# Patient Record
Sex: MALE | Race: WHITE | Employment: OTHER | URBAN - METROPOLITAN AREA
[De-identification: names, ages, dates, MRNs, and addresses within clinical notes are randomized per-mention and may not be internally consistent; named-entity substitution may affect disease eponyms.]

---

## 2017-01-11 ENCOUNTER — NURSING HOME VISIT (OUTPATIENT)
Dept: GERIATRICS | Facility: CLINIC | Age: 82
End: 2017-01-11
Payer: MEDICARE

## 2017-01-11 VITALS
BODY MASS INDEX: 25.83 KG/M2 | SYSTOLIC BLOOD PRESSURE: 111 MMHG | RESPIRATION RATE: 18 BRPM | WEIGHT: 180 LBS | OXYGEN SATURATION: 94 % | HEART RATE: 82 BPM | DIASTOLIC BLOOD PRESSURE: 62 MMHG | TEMPERATURE: 97.8 F

## 2017-01-11 DIAGNOSIS — E11.649 TYPE 2 DIABETES MELLITUS WITH HYPOGLYCEMIA WITHOUT COMA, WITH LONG-TERM CURRENT USE OF INSULIN (H): Primary | ICD-10-CM

## 2017-01-11 DIAGNOSIS — Z79.4 TYPE 2 DIABETES MELLITUS WITH HYPOGLYCEMIA WITHOUT COMA, WITH LONG-TERM CURRENT USE OF INSULIN (H): Primary | ICD-10-CM

## 2017-01-11 DIAGNOSIS — F03.90 DEMENTIA WITHOUT BEHAVIORAL DISTURBANCE, UNSPECIFIED DEMENTIA TYPE: ICD-10-CM

## 2017-01-11 DIAGNOSIS — I10 BENIGN ESSENTIAL HYPERTENSION: ICD-10-CM

## 2017-01-11 DIAGNOSIS — R13.10 DYSPHAGIA, UNSPECIFIED TYPE: ICD-10-CM

## 2017-01-11 PROCEDURE — 99207 ZZC CDG-CORRECTLY CODED, REVIEWED AND AGREE: CPT | Performed by: NURSE PRACTITIONER

## 2017-01-11 PROCEDURE — 99309 SBSQ NF CARE MODERATE MDM 30: CPT | Performed by: NURSE PRACTITIONER

## 2017-01-11 NOTE — PROGRESS NOTES
[place] [facility]    Chief Complaint   Patient presents with     skilled nursing Regulatory       HPI:    Titus Seay is a 89 year old  (10/27/1927), who is being seen today for a federally mandated E/M visit at [facility].       Today's concerns are:  Type 2 diabetes mellitus with hypoglycemia, with current long term insulin use (H)  Lantus was increased recently due to elevated BGs (300s) and A1c. Although he was also ill with pneumonia.  Last BG Levels:    AM: 97, 121, 123, 80, 99, 87    Noon: 185, 257, 245, 156, 132, 468    Dinner: 176, 276, 204, 118, 75, 256    A1C      8.7   12/14/2016  A1C      8.6   9/21/2016      Dementia without behavioral disturbance, unspecified dementia type  No behavioral concerns per nursing. Poor short-term memory. Needs assist with ADLs, 24 hour supervision. No recent falls, but does sustain frequent skin tears.    Benign essential hypertension  No antihypertensive medication other than lasix.  BP readings range:  111/62  150/91  109/69  120/73  112/75  121/72  124/94  116/61  118/68      Dysphagia, unspecified type  Currently being followed by SLP due to recurrent aspiration pneumonia. He is on nectar thick liquids and still shows some signs of aspiration. She had wanted to conduct a video swallow study, but his daughter declined, saying that they wouldn't want his diet further restricted anyway for quality of life.      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 He also has no past medical history of Thyroid disease.  PAST SURGICAL HISTORY:  has past surgical history that includes drain skin abscess simple (7/2007); REMOVE EYE W IMPLANT (1995); colonoscopy; Excise mass head (11/15/2012); TOTAL KNEE ARTHROPLASTY (8/2010); and Fusion cervical posterior one level (10/14/2013).  FAMILY HISTORY: family history includes Alzheimer Disease in his father; Breast Cancer in his sister; C.A.D. in his brother; Cancer - colorectal in his mother; Circulatory in his brother; DIABETES in his brother, mother, and sister. There is no history of Anesthesia Reaction, OSTEOPOROSIS, or Thyroid Disease.  SOCIAL HISTORY:  reports that he quit smoking about 33 years ago. His smoking use included Pipe. He has never used smokeless tobacco. He reports that he does not drink alcohol or use illicit drugs.    MEDICATIONS:  Current Outpatient Prescriptions   Medication Sig Dispense Refill     insulin glargine (LANTUS SOLOSTAR) 100 UNIT/ML injection Inject 20 Units Subcutaneous every morning       ipratropium - albuterol 0.5 mg/2.5 mg/3 mL (DUONEB) 0.5-2.5 (3) MG/3ML nebulization Take 1 vial by nebulization 4 times daily as needed for shortness of breath / dyspnea or wheezing        insulin lispro (HUMALOG KWIKPEN) 100 UNIT/ML soln Inject 3 Units Subcutaneous daily Give after lunch       Skin Protectants, Misc. (HYDROCERIN) LOTN Apply to right lower leg topically as needed for dry skin apply to right leg once a day       sodium chloride (OCEAN) 0.65 % nasal spray Spray 2 sprays into both nostrils 2 times daily       Furosemide (LASIX PO) Take 20 mg by mouth 2 times daily        POTASSIUM CHLORIDE PO Take 10 mEq by mouth 2 times daily        triamcinolone (KENALOG) 0.1 % cream Apply topically 2 times daily Apply to calves       Acetaminophen (TYLENOL PO) Take  1,000 mg by mouth 3 times daily       traMADol (ULTRAM) 25 MG TABS Take 25 mg by mouth daily Before BF scheduled.       loperamide (IMODIUM) 2 MG capsule Take 2 mg by mouth as needed for diarrhea 1 capsule after each loose stool as needed.Notify MD if diarrhea persists longer than 48 hours       METFORMIN HCL PO Take 1,000 mg by mouth 2 times daily (with meals)        PREDNISONE PO Take 10 mg by mouth daily        polyvinyl alcohol (LIQUIFILM TEARS) 1.4 % ophthalmic solution Place 1 drop into both eyes 2 times daily       Mirtazapine (REMERON PO) Take 15 mg by mouth At Bedtime          Case Management:  I have reviewed the care plan and MDS and do agree with the plan. Patient's desire to return to the community is not present.  Information reviewed:  Medications, vital signs, orders, and nursing notes.    ROS:  Reliability questionable secondary to cognitive impairment. Denies chest pain, shortness of breath, fevers, chills, headache, nausea, vomiting, dysuria or bowel abnormalities.      Exam:  /62 mmHg  Pulse 82  Temp(Src) 97.8  F (36.6  C)  Resp 18  Wt 180 lb (81.647 kg)  SpO2 94%   GENERAL APPEARANCE:  Alert, in no distress, cooperative  ENT:  Mouth and posterior oropharynx normal, moist mucous membranes, normal hearing acuity  EYES:  EOM, conjunctivae, lids, pupils and irises normal  NECK:  No adenopathy,masses or thyromegaly  RESP:  respiratory effort and palpation of chest normal, lungs with mild expiratory wheezing, no crackles or rhonchi , no respiratory distress  CV:  Palpation and auscultation of heart done , regular rate and rhythm, no murmur, rub, or gallop, peripheral edema 2+ in BLE  ABDOMEN:  normal bowel sounds, soft, nontender, no hepatosplenomegaly or other masses  SKIN:  skin thin, several bruises on arms, hands, legs. Several darker bruises, small skin tears on left shin  PSYCH:  oriented to self, insight and judgement impaired, memory impaired       Lab/Diagnostic data:      CBC  RESULTS:   Recent Labs   Lab Test 12/14/16 11/25/16   WBC  9.6  4.5   RBC  3.82*  4.06   HGB  11.7*  12.8*   HCT  36.7*  37.7   MCV  96  93   MCH  30.6  31.5   MCHC  31.9  34.0   RDW  14.0  12.9   PLT  397  246       Last Basic Metabolic Panel:  Recent Labs   Lab Test 12/14/16 11/25/16 02/14/14   0610   NA  139  139   < >  132*   POTASSIUM  4.3  4.0   < >  3.9   CHLORIDE  102  102   < >  98   ELEANOR  8.6  8.9   < >  7.7*   CO2  30   --    --   33*   BUN  16  22   < >  24   CR  0.89  0.95   < >  0.69   GLC  109*  192*   < >  232*    < > = values in this interval not displayed.       Liver Function Studies -   Recent Labs   Lab Test 07/13/16 02/09/14   1955   PROTTOTAL  6.4  4.9*   ALBUMIN  3.2  2.3*   BILITOTAL  0.4  0.4   ALKPHOS  106  100   AST  18  30   ALT  20  46       A1C      8.7   12/14/2016  A1C      8.6   9/21/2016        ASSESSMENT/PLAN  (E11.649,  Z79.4) Type 2 diabetes mellitus with hypoglycemia without coma, with long-term current use of insulin (H)  (primary encounter diagnosis)  Comment: Too tightly controlled now with some hypoglycemia. Will need to decrease basal insulin. He traditionally has higher BG at dinner.  Plan: Decrease lantus to 16 units daily. Increase lunch time Novolog to 5 units. BGM TID. Adjust medication as clinically indicated.    (F03.90) Dementia without behavioral disturbance, unspecified dementia type  Comment: Chronic, progressive. Needs 24 hour skilled nursing care. HPI/ROS unreliable with cognitive impairment. Expect further functional and cognitive decline. Expect weight loss.  Plan: Continue 24 hour care. Monitor weight. Monitor functional status. Monitor for behavioral disturbances.    (I10) Benign essential hypertension  Comment: Controlled. To avoid risk of hypotension, falls, dizziness and tissue hypoperfusion, recommend  BP goal is < 150/90mmHg.  Plan: Continue current POC with no changes at this time and adjustments as needed.    (R13.10) Dysphagia, unspecified  type  Comment: Due to dementia. Not expected to improve. He will be at ongoing risk for aspiration pneumonia.  Plan: Monitor for respiratory distress, hypoxia, fever.         Orders:  Decrease lantus to 16 units  Increase lunch novolog to 5 units      Electronically signed by:  DRE Nicholson [facility]  Pager: [phone]

## 2017-02-08 ENCOUNTER — NURSING HOME VISIT (OUTPATIENT)
Dept: GERIATRICS | Facility: CLINIC | Age: 82
End: 2017-02-08
Payer: MEDICARE

## 2017-02-08 VITALS
WEIGHT: 173 LBS | DIASTOLIC BLOOD PRESSURE: 67 MMHG | HEART RATE: 65 BPM | HEIGHT: 68 IN | SYSTOLIC BLOOD PRESSURE: 109 MMHG | OXYGEN SATURATION: 97 % | BODY MASS INDEX: 26.22 KG/M2 | RESPIRATION RATE: 18 BRPM | TEMPERATURE: 97.2 F

## 2017-02-08 DIAGNOSIS — G70.00 MYASTHENIA GRAVIS (H): ICD-10-CM

## 2017-02-08 DIAGNOSIS — E11.65 TYPE 2 DIABETES MELLITUS WITH HYPERGLYCEMIA, UNSPECIFIED LONG TERM INSULIN USE STATUS: Primary | ICD-10-CM

## 2017-02-08 DIAGNOSIS — R60.0 BILATERAL LOWER EXTREMITY EDEMA: ICD-10-CM

## 2017-02-08 DIAGNOSIS — I10 BENIGN ESSENTIAL HYPERTENSION: ICD-10-CM

## 2017-02-08 DIAGNOSIS — Z13.31 SCREENING FOR DEPRESSION: ICD-10-CM

## 2017-02-08 DIAGNOSIS — F03.90 DEMENTIA WITHOUT BEHAVIORAL DISTURBANCE, UNSPECIFIED DEMENTIA TYPE: ICD-10-CM

## 2017-02-08 PROCEDURE — 99207 ZZC CDG-CORRECTLY CODED, REVIEWED AND AGREE: CPT | Performed by: NURSE PRACTITIONER

## 2017-02-08 PROCEDURE — 99318 ZZC ANNUAL NURSING FAC ASSESSMNT, STABLE: CPT | Performed by: NURSE PRACTITIONER

## 2017-02-08 NOTE — PROGRESS NOTES
[place] [facility]  Chief Complaint   Patient presents with     Annual Comprehensive Nursing Home       HPI:    Titus Seay is a 89 year old  (10/27/1927), who is being seen today for an annual comprehensive visit at [facility].       Today's concerns are:  Type 2 diabetes mellitus with hyperglycemia, unspecified long term insulin use status (H)  On 1/11/17: Decreased lantus to 16 units, Increased lunch novolog to 5 units.    Last BG Levels starting with most recent:    AM:      134, 134, 136, 150, 109, 89,  127    Noon:   132, 218, 246, 469, 326, 145,230    Dinner:         230, 197, 144, 311, 62,  118    A1C      8.7   12/14/2016  A1C      8.6   9/21/2016      Dementia without behavioral disturbance, unspecified dementia type  Patient has had notable decline in the past few months. He is confused more often about where he is and what his day will be like. He often asks about going to work or thinks he has a dinner to get to with family. He has developed dysphagia and been treated for aspiration pneumonia twice. He has not had a cognitive eval since his admission 9/2015.    Benign essential hypertension  BP readings range:  109/67  133/78  142/83  118/71  126/72  124/75  146/85  132/75    Bilateral lower extremity edema  Chronic. Once daily lasix was not effective, edema improved with BID dosing. Elevation and ACE wraps are also very effective, but he puts his legs in a dependent position often and will remove the ACE wraps himself. He gets skin tears and open wounds very easily.    Myasthenia gravis (H)  Initial diagnosis was in 2014. He has been on maintenance prednisone for about 2 years. As mentioned, he has thin skin, gets frequent skin tears. 2 skin tears in the past developed into larger wounds with subsequent cellulitis.    Screening for depression  PHQ-9 done 9/1/16 ; score = 02/27.        ALLERGIES: Sulfa drugs and Bactrim  PROBLEM LIST:  Patient Active Problem List   Diagnosis      Nonproliferative diabetic retinopathy (H)     Diabetic macular edema (H)     Wenckebach's incomplete AV block     Hyperlipidemia LDL goal <100     Advanced directives, counseling/discussion     DJD (degenerative joint disease) of knee     Cervicalgia     Cervical spine tumor     Status post cervical spinal fusion     Compression of brainstem (H)     Phimosis     Adjustment disorder with depressed mood     Dysphagia     Syncope and collapse     Myasthenia gravis (H)     GI bleed     Mild cognitive impairment     OA (osteoarthritis) of knee     Generalized muscle weakness     Left knee pain     Lumbago     Skin tear of left lower leg without complication, sequela     Bilateral lower extremity edema     Benign essential hypertension     Dementia without behavioral disturbance, unspecified dementia type     Type 2 diabetes mellitus with hyperglycemia (H)     PAST MEDICAL HISTORY:  has a past medical history of Type II or unspecified type diabetes mellitus without mention of complication, not stated as uncontrolled; Nonproliferative diabetic retinopathy NOS(362.03) (H); Diabetic macular edema(362.07) (H); Wenckebach's incomplete AV block; Hypertension; URI (upper respiratory infection); Unspecified cerebral artery occlusion with cerebral infarction; Cataract, left; History of eye removal (1989); 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 He also has no past medical history of Thyroid disease.  PAST SURGICAL HISTORY:  has past surgical history that includes drain skin abscess simple (7/2007); REMOVE EYE W IMPLANT (1995); colonoscopy; Excise mass head (11/15/2012); TOTAL KNEE ARTHROPLASTY (8/2010); and Fusion cervical posterior one level (10/14/2013).  FAMILY HISTORY: family history includes Alzheimer Disease in his father; Breast Cancer in his sister;  C.A.D. in his brother; Cancer - colorectal in his mother; Circulatory in his brother; DIABETES in his brother, mother, and sister. There is no history of Anesthesia Reaction, OSTEOPOROSIS, or Thyroid Disease.  SOCIAL HISTORY:  reports that he quit smoking about 33 years ago. His smoking use included Pipe. He has never used smokeless tobacco. He reports that he does not drink alcohol or use illicit drugs.  IMMUNIZATIONS:  Most Recent Immunizations   Administered Date(s) Administered     Influenza (High Dose) 3 valent vaccine 10/17/2013     Influenza (IIV3) 10/19/2016     Pneumococcal (PCV 13) 09/25/2015     Pneumococcal 23 valent 12/31/2009     TD (ADULT, 7+) 01/05/2009     Above immunizations pulled from [facility]. [facility] and facility records also reconciled. Future immunizations are not needed at this point as all recommended immunizations are up to date.   MEDICATIONS:  Current Outpatient Prescriptions   Medication Sig Dispense Refill     insulin glargine (LANTUS SOLOSTAR) 100 UNIT/ML injection Inject 16 Units Subcutaneous every morning       insulin lispro (HUMALOG KWIKPEN) 100 UNIT/ML injection Inject 5 Units Subcutaneous daily Give after lunch       ipratropium - albuterol 0.5 mg/2.5 mg/3 mL (DUONEB) 0.5-2.5 (3) MG/3ML nebulization Take 1 vial by nebulization 4 times daily as needed for shortness of breath / dyspnea or wheezing        Skin Protectants, Misc. (HYDROCERIN) LOTN Apply to right lower leg topically as needed for dry skin apply to right leg once a day       sodium chloride (OCEAN) 0.65 % nasal spray Spray 2 sprays into both nostrils 2 times daily       Furosemide (LASIX PO) Take 20 mg by mouth 2 times daily        POTASSIUM CHLORIDE PO Take 10 mEq by mouth 2 times daily        triamcinolone (KENALOG) 0.1 % cream Apply topically 2 times daily Apply to calves       Acetaminophen (TYLENOL PO) Take 1,000 mg by mouth 3 times daily       traMADol (ULTRAM) 25 MG TABS Take 25 mg by mouth daily Before BF  "scheduled.       loperamide (IMODIUM) 2 MG capsule Take 2 mg by mouth as needed for diarrhea 1 capsule after each loose stool as needed.Notify MD if diarrhea persists longer than 48 hours       METFORMIN HCL PO Take 1,000 mg by mouth 2 times daily (with meals)        PREDNISONE PO Take 10 mg by mouth daily        polyvinyl alcohol (LIQUIFILM TEARS) 1.4 % ophthalmic solution Place 1 drop into both eyes 2 times daily       Mirtazapine (REMERON PO) Take 15 mg by mouth At Bedtime          Case Management:  I have reviewed the facility/SNF care plan/MDS which was done 12/1/16, including the falls risk, nutrition and pain screening. I also reviewed the current immunizations, and preventive care.. Future cancer screening is not clinically indicated secondary to age/goals of care.   Patient's desire to return to the community is not present.    Advance Directive Discussion:    I reviewed the current advanced directives as reflected in EPIC and the facility chart. I contacted the first party and left a message regarding the plan of Care. I reviewed the POLST, resigned, dated and sent to [employer].  I did not due to cognitive impairment review the advance directives with the resident.     Team Discussion:  I communicated with the appropriate disciplines involved with the Plan of Care:   Nursing, PT,  and Dietician    Patient Goal:  Patient's goal is pain control and comfort.    Information reviewed:  Medications, vital signs, orders, and nursing notes.    ROS:  Reliability questionable secondary to cognitive impairment. Denies chest pain, shortness of breath, fevers, chills, headache, nausea, vomiting, dysuria or bowel abnormalities. He repeatedly asks about going to work today    Exam:  /67 mmHg  Pulse 65  Temp(Src) 97.2  F (36.2  C)  Resp 18  Ht 5' 8\" (1.727 m)  Wt 173 lb (78.472 kg)  BMI 26.31 kg/m2  SpO2 97%  GENERAL APPEARANCE:  Alert, in no distress  ENT:  Mouth and posterior oropharynx " normal, moist mucous membranes, [place]  EYES:  EOM, conjunctivae, lids, pupils and irises normal  NECK:  No adenopathy,masses or thyromegaly  RESP:  respiratory effort and palpation of chest normal, lungs clear to auscultation , no respiratory distress  CV:  Palpation and auscultation of heart done , regular rate and rhythm, no murmur, rub, or gallop, Location; Lower extremity, Skin:  multiple small stage 2 open areas, Temperature: Warm, Color:  Ruborous, Hair:  Absent and Edema:  +1  ABDOMEN:  normal bowel sounds, soft, nontender, no hepatosplenomegaly or other masses  SKIN:  see vascular  PSYCH:  oriented to self, insight and judgement impaired, memory impaired , affect abnormal flat         Lab/Diagnostic data:      CBC RESULTS:   Recent Labs   Lab Test 12/14/16 11/25/16   WBC  9.6  4.5   RBC  3.82*  4.06   HGB  11.7*  12.8*   HCT  36.7*  37.7   MCV  96  93   MCH  30.6  31.5   MCHC  31.9  34.0   RDW  14.0  12.9   PLT  397  246       Last Basic Metabolic Panel:  Recent Labs   Lab Test 12/14/16 11/25/16 02/14/14   0610   NA  139  139   < >  132*   POTASSIUM  4.3  4.0   < >  3.9   CHLORIDE  102  102   < >  98   ELEANOR  8.6  8.9   < >  7.7*   CO2  30   --    --   33*   BUN  16  22   < >  24   CR  0.89  0.95   < >  0.69   GLC  109*  192*   < >  232*    < > = values in this interval not displayed.       Liver Function Studies -   Recent Labs   Lab Test 07/13/16 02/09/14 1955   PROTTOTAL  6.4  4.9*   ALBUMIN  3.2  2.3*   BILITOTAL  0.4  0.4   ALKPHOS  106  100   AST  18  30   ALT  20  46       A1C      8.7   12/14/2016  A1C      8.6   9/21/2016            ASSESSMENT/PLAN  (E11.65) Type 2 diabetes mellitus with hyperglycemia, unspecified long term insulin use status (H)  (primary encounter diagnosis)  Comment: BG are so variable at this time, it is too difficult to adjust insulin. There was only 1 day where he had hypoglycemia, otherwise fasting BG are good. Would not reduce basal insulin. He may need prandial insulin  with breakfast as well, but too variable to start at this time  Plan: Continue current POC with no changes at this time. BGM TID. Adjust medication as clinically indicated.    (F03.90) Dementia without behavioral disturbance, unspecified dementia type  Comment: Chronic, progressive. Recent decline is consistent with the disease. Needs 24 hour skilled nursing care. HPI/ROS unreliable with cognitive impairment. Expect further functional and cognitive decline. Expect weight loss.  Plan: Therapy will conduct cognitive solutions evaluation to better determine his care needs. Continue 24 hour care. Monitor weight. Monitor functional status. Monitor for behavioral disturbances.    (I10) Benign essential hypertension  Comment: Based on JNC-8 goals,  patients age of 89 year old, presence of diabetes or CKD, and goals of care goal BP is <150/90 mm Hg. BP is controlled.   Plan: Continue current POC with no changes at this time with routine assessment.    (R60.0) Bilateral lower extremity edema  Comment: Chronic, reasonably controlled with current interventions.   Plan: Continue current POC with no changes at this time and adjustments as needed.    (G70.00) Myasthenia gravis (H)  Comment: His symptoms of dysphagia and progressive weakness are likely related to dementia. He may benefit from a tapering of the prednisone due to his diabetes and skin issues. Will need to investigate further, consult physician, and daughter.    (Z13.89) Screening for depression  Comment: Depression screen done: PHQ-9 Given screen score and clinical assessment patient is stable on current plan of care/interventions of Remeron and on going assessment will continue.          Electronically signed by:  DRE Nicholson [facility]  Pager: [phone]

## 2017-03-07 VITALS
HEART RATE: 63 BPM | OXYGEN SATURATION: 97 % | BODY MASS INDEX: 26.61 KG/M2 | WEIGHT: 175 LBS | SYSTOLIC BLOOD PRESSURE: 112 MMHG | RESPIRATION RATE: 18 BRPM | TEMPERATURE: 98.1 F | DIASTOLIC BLOOD PRESSURE: 71 MMHG

## 2017-03-08 ENCOUNTER — NURSING HOME VISIT (OUTPATIENT)
Dept: GERIATRICS | Facility: CLINIC | Age: 82
End: 2017-03-08
Payer: MEDICARE

## 2017-03-08 DIAGNOSIS — R60.0 BILATERAL LEG EDEMA: ICD-10-CM

## 2017-03-08 DIAGNOSIS — G70.00 MYASTHENIA GRAVIS (H): ICD-10-CM

## 2017-03-08 DIAGNOSIS — Z79.4 TYPE 2 DIABETES MELLITUS WITH RETINOPATHY, WITH LONG-TERM CURRENT USE OF INSULIN, MACULAR EDEMA PRESENCE UNSPECIFIED, UNSPECIFIED LATERALITY, UNSPECIFIED RETINOPATHY SEVERITY (H): Primary | ICD-10-CM

## 2017-03-08 DIAGNOSIS — E11.319 TYPE 2 DIABETES MELLITUS WITH RETINOPATHY, WITH LONG-TERM CURRENT USE OF INSULIN, MACULAR EDEMA PRESENCE UNSPECIFIED, UNSPECIFIED LATERALITY, UNSPECIFIED RETINOPATHY SEVERITY (H): Primary | ICD-10-CM

## 2017-03-08 PROCEDURE — 99207 ZZC CDG-CORRECTLY CODED, REVIEWED AND AGREE: CPT | Performed by: INTERNAL MEDICINE

## 2017-03-08 PROCEDURE — 99309 SBSQ NF CARE MODERATE MDM 30: CPT | Performed by: INTERNAL MEDICINE

## 2017-03-08 NOTE — PROGRESS NOTES
[place] [facility]  Nursing Home Regulatory Visit  March 8, 2017    Chief Complaint   Patient presents with     skilled nursing Regulatory       HPI:    Titus Seay is a 89 year old  (10/27/1927), who is being seen today for a federally mandated E/M visit at [facility]. Today's concerns are:    1) DM, Type II -- Hgb A1c 8.7 in December. Insulin subsequently adjusted, but sugars remain quite labile (dietary). Current regimen is metformin 1000 mg BID and glargine 16 units qam and lispro 5 units before lunch.   2) Myasthenia Gravis -- Previously followed by Dr. Peña with neurology. Currently maintained on prednisone 7.5 mg daily (decreased from 10 mg daily since I last saw him). No signs of exacerbation.   3) Chronic Bilateral LE Edema -- dependent edema with prednisone likely also contributing. Managed with furosemide 20 mg BID.     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     Coronary artery disease      systolic heart failure     Dementia      Diabetic macular edema(362.07) (H)      History of eye removal 1989     central retinal artery occlusion     Hypertension      Myasthenia gravis (H) 12/2013     ACh receptor AB postive, bulbar weakness     Nonproliferative diabetic retinopathy NOS(362.03) (H)      PE (pulmonary embolism) (H)      TIA (transient ischaemic attack) 2/2012     Type II or unspecified type diabetes mellitus without mention of complication, not stated as uncontrolled      Unspecified cerebral artery occlusion with cerebral infarction      TIA FEB. 2012      URI (upper respiratory infection)      sore throat, cough, hoarse - started last night - worse today      Wenckebach's incomplete AV block        PAST SURGICAL HISTORY:   Past Surgical History   Procedure Laterality Date     Drain  skin abscess simple  2007     right foot     C remove eye w implant       right eye     Colonoscopy       Excise mass head  11/15/2012     Procedure: EXCISE MASS HEAD;  EXCISION OF SCALP MASSES TIMES TWO;  Surgeon: Stevan Gonsales MD;  Location: [facility] OR     C total knee arthroplasty  2010     Fusion cervical posterior one level  10/14/2013     Procedure: FUSION CERVICAL POSTERIOR ONE LEVEL;  Cervical 1 - 2 Posterior Cervical Fusions with neuro monitoring, and Dorsal Slit of Foreskin;  Surgeon: Miah Hutson MD;  Location: [facility] OR       FAMILY HISTORY:   Family History   Problem Relation Age of Onset     Alzheimer Disease Father        at age 79 of Pneumonia     Cancer - colorectal Mother       at age 79     Breast Cancer Sister       at age 44      DIABETES Mother      DIABETES Brother       at age 70      Circulatory Brother       of an anurysm at age 70     DIABETES Sister      C.A.D. Brother      He  suddenly     Anesthesia Reaction No family hx of      OSTEOPOROSIS No family hx of      Thyroid Disease No family hx of        SOCIAL HISTORY:   Lives in a SNF    MEDICATIONS:  Current Outpatient Prescriptions   Medication Sig Dispense Refill     insulin glargine (LANTUS SOLOSTAR) 100 UNIT/ML injection Inject 16 Units Subcutaneous every morning       insulin lispro (HUMALOG KWIKPEN) 100 UNIT/ML injection Inject 5 Units Subcutaneous daily Give after lunch       ipratropium - albuterol 0.5 mg/2.5 mg/3 mL (DUONEB) 0.5-2.5 (3) MG/3ML nebulization Take 1 vial by nebulization 4 times daily as needed for shortness of breath / dyspnea or wheezing        Skin Protectants, Misc. (HYDROCERIN) LOTN Apply to right lower leg topically as needed for dry skin apply to right leg once a day       sodium chloride (OCEAN) 0.65 % nasal spray Spray 2 sprays into both nostrils 2 times daily       Furosemide (LASIX PO) Take 20 mg by mouth 2 times daily        POTASSIUM CHLORIDE PO Take 10 mEq by mouth  2 times daily        triamcinolone (KENALOG) 0.1 % cream Apply topically 2 times daily Apply to calves       Acetaminophen (TYLENOL PO) Take 1,000 mg by mouth 3 times daily       traMADol (ULTRAM) 25 MG TABS Take 25 mg by mouth daily Before BF scheduled.       loperamide (IMODIUM) 2 MG capsule Take 2 mg by mouth as needed for diarrhea 1 capsule after each loose stool as needed.Notify MD if diarrhea persists longer than 48 hours       METFORMIN HCL PO Take 1,000 mg by mouth 2 times daily (with meals)        PREDNISONE PO Take 7.5 mg by mouth daily        polyvinyl alcohol (LIQUIFILM TEARS) 1.4 % ophthalmic solution Place 1 drop into both eyes 2 times daily       Mirtazapine (REMERON PO) Take 15 mg by mouth At Bedtime          Medications reviewed:  Medications reconciled to facility chart and changes were made to reflect current medications as identified as above med list. Below are the changes that were made:   Medications stopped since last EPIC medication reconciliation:   There are no discontinued medications.  Medications started since last [facility] medication reconciliation:  No orders of the defined types were placed in this encounter.      Case Management:  I have reviewed the care plan and MDS and do agree with the plan.   Information reviewed:  Medications, vital signs, orders, and nursing notes.    ROS:  Unable to be obtained due to cognitive impairment or aphasia.     PHYSICAL EXAM:  /71  Pulse 63  Temp 98.1  F (36.7  C)  Resp 18  Wt 175 lb (79.4 kg)  SpO2 97%  BMI 26.61 kg/m2  Gen: sitting in wheelchair, alert, cooperative and in no acute distress  Card: RRR, S1, S2, no murmurs  Resp: lungs clear to auscultation bilaterally  GI: abdomen soft, not-tender  Ext: ACE wraps in place on bilateral LEs  Neuro: CX II-XII grossly in tact; ROM in all four extremities grossly in tact  Psych: memory, judgement and insight impaired    Lab/Diagnostic data:  Reviewed as per Epic    ASSESSMENT/PLAN    1) DM, Type II    Hgb A1c 8.7 in December. Insulin subsequently adjusted, but sugars remain quite labile (dietary).   -- continues on metformin 1000 mg BID and glargine 16 units qam and lispro 5 units before lunch  -- follow sugars and adjust insulin as able/needed    2) Myasthenia Gravis   Previously followed by Dr. Peña with neurology. Currently maintained on prednisone 7.5 mg daily (decreased from 10 mg daily since I last saw him). No signs of exacerbation.   -- continues on prednisone 7.5 mg daily -- agree w/ slow taper given DM, skin changes    3) Chronic Bilateral LE Edema   Dependent edema with prednisone likely also contributing.   -- continues on furosemide 20 mg BID    Titus Seay is stable. No concerns per nursing. No changes to plan of care today.    Electronically signed by:  Miya Crisostomo MD

## 2017-03-22 ENCOUNTER — NURSING HOME VISIT (OUTPATIENT)
Dept: GERIATRICS | Facility: CLINIC | Age: 82
End: 2017-03-22
Payer: MEDICARE

## 2017-03-22 VITALS
BODY MASS INDEX: 27.06 KG/M2 | WEIGHT: 178 LBS | TEMPERATURE: 97.3 F | RESPIRATION RATE: 14 BRPM | OXYGEN SATURATION: 96 % | HEART RATE: 68 BPM | SYSTOLIC BLOOD PRESSURE: 118 MMHG | DIASTOLIC BLOOD PRESSURE: 70 MMHG

## 2017-03-22 DIAGNOSIS — G47.00 INSOMNIA, UNSPECIFIED TYPE: Primary | ICD-10-CM

## 2017-03-22 DIAGNOSIS — G70.00 MYASTHENIA GRAVIS (H): ICD-10-CM

## 2017-03-22 PROCEDURE — 99308 SBSQ NF CARE LOW MDM 20: CPT | Mod: GW | Performed by: NURSE PRACTITIONER

## 2017-03-22 NOTE — PROGRESS NOTES
[place] [facility]    Chief Complaint   Patient presents with     Insomnia       HPI:    Titus Seay is a 89 year old  (10/27/1927), who is being seen today for an episodic care visit at [facility].       Today's concern is:  Insomnia, unspecified type  Nursing is reporting that patient does not sleep well at night. After discussion with daughter, will trial trazodone    Myasthenia gravis (H)  Due to frequent skin injuries that develop into cellulitis and worsening diabetes control, plan is to wean off prednisone if able. His symptoms leading to diagnosis of this were weakness and dysphagia. He has these ongoing, likely related to advancing dementia. Prednisone was decreased from 10mg to 7.5mg one month ago. No worsening of symptoms noted, daughter is in agreement to decrease further.      REVIEW OF SYSTEMS:  Reliability questionable secondary to cognitive impairment. Denies chest pain, shortness of breath, fevers, chills, headache, nausea, vomiting, dysuria or bowel abnormalities.    EXAM:  /70  Pulse 68  Temp 97.3  F (36.3  C)  Resp 14  Wt 178 lb (80.7 kg)  SpO2 96%  BMI 27.06 kg/m2  GENERAL APPEARANCE:  Alert, in no distress      ASSESSMENT / PLAN:  (G47.00) Insomnia, unspecified type  (primary encounter diagnosis)  Comment: Further increase in Remeron is unlikely to help with insomnia, would be best treated with trazodone  Plan: Trazodone 25mg qhs    (G70.00) Myasthenia gravis (H)  Comment: Difficult to assess whether symptoms would be from this vs dementia. Due to goals of care, limited life expectancy and side effects of prednisone, weaning off medication slowly is appropriate  Plan: Decrease prednisone to 5mg daily        Orders:  Trazodone 25mg po qhs  Decreased Prednisone 5mg po qday.         DRE Nicholson [facility]  Pager: [phone]

## 2017-03-24 RX ORDER — TRAZODONE HYDROCHLORIDE 50 MG/1
25 TABLET, FILM COATED ORAL AT BEDTIME
Qty: 30 TABLET | Refills: 1
Start: 2017-03-24 | End: 2017-05-10

## 2017-05-03 ENCOUNTER — NURSING HOME VISIT (OUTPATIENT)
Dept: GERIATRICS | Facility: CLINIC | Age: 82
End: 2017-05-03
Payer: MEDICARE

## 2017-05-03 VITALS
RESPIRATION RATE: 18 BRPM | TEMPERATURE: 97 F | BODY MASS INDEX: 26.46 KG/M2 | SYSTOLIC BLOOD PRESSURE: 129 MMHG | WEIGHT: 174 LBS | HEART RATE: 71 BPM | OXYGEN SATURATION: 95 % | DIASTOLIC BLOOD PRESSURE: 75 MMHG

## 2017-05-03 DIAGNOSIS — R60.0 BILATERAL LOWER EXTREMITY EDEMA: ICD-10-CM

## 2017-05-03 DIAGNOSIS — F03.90 DEMENTIA WITHOUT BEHAVIORAL DISTURBANCE, UNSPECIFIED DEMENTIA TYPE: ICD-10-CM

## 2017-05-03 DIAGNOSIS — I10 BENIGN ESSENTIAL HYPERTENSION: ICD-10-CM

## 2017-05-03 DIAGNOSIS — E11.649 TYPE 2 DIABETES MELLITUS WITH HYPOGLYCEMIA WITHOUT COMA, WITH LONG-TERM CURRENT USE OF INSULIN (H): Primary | ICD-10-CM

## 2017-05-03 DIAGNOSIS — G70.00 MYASTHENIA GRAVIS (H): ICD-10-CM

## 2017-05-03 DIAGNOSIS — Z79.4 TYPE 2 DIABETES MELLITUS WITH HYPOGLYCEMIA WITHOUT COMA, WITH LONG-TERM CURRENT USE OF INSULIN (H): Primary | ICD-10-CM

## 2017-05-03 PROCEDURE — 99207 ZZC CDG-CORRECTLY CODED, REVIEWED AND AGREE: CPT | Performed by: NURSE PRACTITIONER

## 2017-05-03 PROCEDURE — 99309 SBSQ NF CARE MODERATE MDM 30: CPT | Mod: GW | Performed by: NURSE PRACTITIONER

## 2017-05-03 NOTE — PROGRESS NOTES
[place] [facility]    Chief Complaint   Patient presents with     [facility] Regulatory       HPI:    Titus Seay is a 89 year old  (10/27/1927), who is being seen today for a federally mandated E/M visit at [facility].         Today's concerns are:  Type 2 diabetes mellitus with hypoglycemia, with long term insulin use (H)  On metformin, lantus, and humalog as below  Last BG Levels:    AM: 110, 128, 92, 126, 139, 117, 109    Noon: 209, 188, 353, 261, 177, 259    Dinner: 154, 160, 78, 158, 216, 109    Lab Results   Component Value Date    A1C 8.7 12/14/2016    A1C 8.6 09/21/2016         Dementia without behavioral disturbance, unspecified dementia type  Patient has had notable decline in the past few months. He is confused more often about where he is and what his day will be like. He often asks about going to work or thinks he has a dinner to get to with family. He has developed dysphagia and been treated for aspiration pneumonia twice.     Benign essential hypertension  BP readings range:  129/75  112/75  131/68  119/73  121/72  141/84  125/73  142/77  135/68    Bilateral lower extremity edema  Chronic, has had occasional skin tears that develop into larger wounds. Daily lasix did not control his edema, improved with BID.  Wt Readings from Last 2 Encounters:   05/03/17 174 lb (78.9 kg)   03/22/17 178 lb (80.7 kg)       Myasthenia gravis (H)  Due to frequent skin injuries that develop into cellulitis and worsening diabetes control, plan is to wean off prednisone if able. His symptoms leading to diagnosis of this were weakness and dysphagia. He has these ongoing, likely related to advancing dementia. Prednisone was decreased from 10mg to 7.5mg 2/2017, then decreased to 5mg 3/22/17. No worsening of symptoms noted, daughter is in agreement with wean plan.        ALLERGIES: Sulfa drugs and Bactrim [sulfamethoxazole w/trimethoprim]  PAST MEDICAL HISTORY:  has a past medical history of Arthritis;  BUNION- right (3/17/2008); Cataract, left; 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 He also has no past medical history of Thyroid disease.  PAST SURGICAL HISTORY:  has a past surgical history that includes drain skin abscess simple (7/2007); REMOVE EYE W IMPLANT (1995); colonoscopy; Excise mass head (11/15/2012); TOTAL KNEE ARTHROPLASTY (8/2010); and Fusion cervical posterior one level (10/14/2013).  FAMILY HISTORY: family history includes Alzheimer Disease in his father; Breast Cancer in his sister; C.A.D. in his brother; Cancer - colorectal in his mother; Circulatory in his brother; DIABETES in his brother, mother, and sister. There is no history of Anesthesia Reaction, OSTEOPOROSIS, or Thyroid Disease.  SOCIAL HISTORY:  reports that he quit smoking about 33 years ago. His smoking use included Pipe. He quit after 30.00 years of use. He has never used smokeless tobacco. He reports that he does not drink alcohol or use illicit drugs.    MEDICATIONS:  Current Outpatient Prescriptions   Medication Sig Dispense Refill     traZODone (DESYREL) 50 MG tablet Take 0.5 tablets (25 mg) by mouth At Bedtime 30 tablet 1     PREDNISONE PO Take 5 mg by mouth daily       insulin glargine (LANTUS SOLOSTAR) 100 UNIT/ML injection Inject 16 Units Subcutaneous every morning       insulin lispro (HUMALOG KWIKPEN) 100 UNIT/ML injection Inject 5 Units Subcutaneous daily Give after lunch       ipratropium - albuterol 0.5 mg/2.5 mg/3 mL  (DUONEB) 0.5-2.5 (3) MG/3ML nebulization Take 1 vial by nebulization 4 times daily as needed for shortness of breath / dyspnea or wheezing        Skin Protectants, Misc. (HYDROCERIN) LOTN Apply to right lower leg topically as needed for dry skin apply to right leg once a day       sodium chloride (OCEAN) 0.65 % nasal spray Spray 2 sprays into both nostrils 2 times daily       Furosemide (LASIX PO) Take 20 mg by mouth 2 times daily        POTASSIUM CHLORIDE PO Take 10 mEq by mouth 2 times daily        triamcinolone (KENALOG) 0.1 % cream Apply topically 2 times daily Apply to calves       Acetaminophen (TYLENOL PO) Take 1,000 mg by mouth 3 times daily       traMADol (ULTRAM) 25 MG TABS Take 25 mg by mouth daily Before BF scheduled.       METFORMIN HCL PO Take 1,000 mg by mouth 2 times daily (with meals)        polyvinyl alcohol (LIQUIFILM TEARS) 1.4 % ophthalmic solution Place 1 drop into both eyes 2 times daily       Mirtazapine (REMERON PO) Take 15 mg by mouth At Bedtime          Case Management:  I have reviewed the care plan and MDS and do agree with the plan. Patient's desire to return to the community is not present.  Information reviewed:  Medications, vital signs, orders, and nursing notes.    ROS:  Reliability questionable secondary to cognitive impairment. Denies chest pain, shortness of breath, fevers, chills, headache, nausea, vomiting, dysuria or bowel abnormalities.  Appetite is good.  No pain     Exam:  /75  Pulse 71  Temp 97  F (36.1  C)  Resp 18  Wt 174 lb (78.9 kg)  SpO2 95%  BMI 26.46 kg/m2   GENERAL APPEARANCE:  Alert, in no distress  ENT:  Mouth and posterior oropharynx normal, moist mucous membranes, [place]  EYES:  EOM, conjunctivae, lids, pupils and irises normal  NECK:  No adenopathy,masses or thyromegaly  RESP:  respiratory effort and palpation of chest normal, lungs clear to auscultation , no respiratory distress  CV:  Palpation and auscultation of heart done , regular rate and  rhythm, no murmur, rub, or gallop. ACE wraps on  ABDOMEN:  normal bowel sounds, soft, nontender, no hepatosplenomegaly or other masses  SKIN:  see vascular  PSYCH:  oriented to self, insight and judgement impaired, memory impaired , affect abnormal flat       Lab/Diagnostic data:    CBC RESULTS:   Recent Labs   Lab Test 12/14/16 11/25/16   WBC  9.6  4.5   RBC  3.82*  4.06   HGB  11.7*  12.8*   HCT  36.7*  37.7   MCV  96  93   MCH  30.6  31.5   MCHC  31.9  34.0   RDW  14.0  12.9   PLT  397  246       Last Basic Metabolic Panel:  Recent Labs   Lab Test 12/14/16 11/25/16 02/14/14   0610   NA  139  139   < >  132*   POTASSIUM  4.3  4.0   < >  3.9   CHLORIDE  102  102   < >  98   ELEANOR  8.6  8.9   < >  7.7*   CO2  30   --    --   33*   BUN  16  22   < >  24   CR  0.89  0.95   < >  0.69   GLC  109*  192*   < >  232*    < > = values in this interval not displayed.       Liver Function Studies -   Recent Labs   Lab Test 07/13/16 02/09/14   1955   PROTTOTAL  6.4  4.9*   ALBUMIN  3.2  2.3*   BILITOTAL  0.4  0.4   ALKPHOS  106  100   AST  18  30   ALT  20  46       TSH   Date Value Ref Range Status   12/09/2015 2.67 mcU/mL Final   12/26/2013 2.55 0.4 - 5.0 mU/L Final       Lab Results   Component Value Date    A1C 8.7 12/14/2016    A1C 8.6 09/21/2016           ASSESSMENT/PLAN  (E11.649,  Z79.4) Type 2 diabetes mellitus with hypoglycemia without coma, with long-term current use of insulin (H)  (primary encounter diagnosis)  Comment: Control is variable with intermittent hypoglycemia that should be avoided. Will need to decrease basal insulin due to this. Noon BG is consistently elevated, evening low at times. Will therefore change Humalog to every meal, but decrease dose to reduce evening hypoglycemia. It is likely that his BG will be changing due to decreased prednisone  Plan: Decrease lantus to 10 units, change humalog to 3 units TID. BGM TID. Adjust medication as clinically indicated.    (F03.90) Dementia without behavioral  disturbance, unspecified dementia type  Comment: Chronic, progressive. Needs 24 hour skilled nursing care. HPI/ROS unreliable due to  cognitive impairment. Expect further functional and cognitive decline. Expect weight loss.  Plan: Continue 24 hour care. Monitor weight. Monitor functional status. Monitor for behavioral disturbances.    (I10) Benign essential hypertension  Comment: Controlled. To avoid risk of hypotension, falls, dizziness and tissue hypoperfusion, recommend  BP goal is < 150/90mmHg.  Plan: Continue current POC with no changes at this time and adjustments as needed.    (R60.0) Bilateral lower extremity edema  Comment: Wraps not removed, but nursing feels edema is controlled. Weight is down.  Plan: Continue current POC with no changes at this time and adjustments as needed.    (G70.00) Myasthenia gravis (H)  Comment: May be difficult to differentiate symptoms of flare from progression of dementia. Due to his DM, frequent wounds with cellulitis, risk outweighs benefits of prednisone  Plan: Decrease prednisone 2.5mg daily        Orders:  Decrease lantus to 10 units qam  Change Humalog to 3 units TID with meals  Decrease prednisone to 2.5mg daily  CBC, BMP, A1c 6/28/17      Electronically signed by:  DRE Nicholson [facility]  Pager: [phone]

## 2017-05-10 ENCOUNTER — NURSING HOME VISIT (OUTPATIENT)
Dept: GERIATRICS | Facility: CLINIC | Age: 82
End: 2017-05-10
Payer: MEDICARE

## 2017-05-10 VITALS
DIASTOLIC BLOOD PRESSURE: 71 MMHG | OXYGEN SATURATION: 95 % | HEART RATE: 62 BPM | WEIGHT: 174 LBS | TEMPERATURE: 97.2 F | BODY MASS INDEX: 26.46 KG/M2 | RESPIRATION RATE: 18 BRPM | SYSTOLIC BLOOD PRESSURE: 109 MMHG

## 2017-05-10 DIAGNOSIS — E11.65 TYPE 2 DIABETES MELLITUS WITH HYPERGLYCEMIA, UNSPECIFIED LONG TERM INSULIN USE STATUS: Primary | ICD-10-CM

## 2017-05-10 DIAGNOSIS — R41.82 ALTERED MENTAL STATUS, UNSPECIFIED ALTERED MENTAL STATUS TYPE: ICD-10-CM

## 2017-05-10 DIAGNOSIS — F03.91 DEMENTIA WITH BEHAVIORAL DISTURBANCE, UNSPECIFIED DEMENTIA TYPE: ICD-10-CM

## 2017-05-10 PROCEDURE — 99207 ZZC CDG-CORRECTLY CODED, REVIEWED AND AGREE: CPT | Performed by: NURSE PRACTITIONER

## 2017-05-10 PROCEDURE — 99309 SBSQ NF CARE MODERATE MDM 30: CPT | Mod: GW | Performed by: NURSE PRACTITIONER

## 2017-05-10 NOTE — PROGRESS NOTES
[facility]    Chief Complaint   Patient presents with     RECHECK       HPI:    Titus Seay is a 89 year old  (10/27/1927), who is being seen today for an episodic care visit at [facility].       Today's concern is:  Type 2 diabetes mellitus with hyperglycemia, unspecified long term insulin use status (H)  Lantus insulin decreased 5/3/17 due to fasting hypoglycemia and humalog changed to TID (was at lunch only)  Last BG Levels:    AM: 108, 129, 132, 130, 91    Noon: 104, 217, 247, 211    Dinner: 132, 155, 123, 143, 94    Lab Results   Component Value Date    A1C 8.7 12/14/2016    A1C 8.6 09/21/2016       Altered mental status, unspecified altered mental status type  For about 3 days, patient has had agitation, verbal outbursts. He has been looking for his wife, has been distressed during the night at times. Staff have not noted any s/sx acute illness. Vitals are stable, no fever. No skin issues currently, he has had recurrent cellulitis in the past. His daughter agrees with stopping the trazodone and checking some labs    Dementia with behavioral disturbance, unspecified dementia type  Has had notable progression over the past year with worsening memory. If his current behavior is ongoing, daughter is in agreement with short-term use of antipsychotic medication       ALLERGIES: Sulfa drugs and Bactrim [sulfamethoxazole w/trimethoprim]  Past Medical, Surgical, Family and Social History reviewed and updated in [facility].    Current Outpatient Prescriptions   Medication Sig Dispense Refill     Insulin Lispro (HUMALOG SC) Inject 3 Units Subcutaneous 2 times daily With lunch and dinner. Give 5 units SC qday.       PREDNISONE PO Take 2.5 mg by mouth daily       ipratropium - albuterol 0.5 mg/2.5 mg/3 mL (DUONEB) 0.5-2.5 (3) MG/3ML nebulization Take 1 vial by nebulization 4 times daily as needed for shortness of breath / dyspnea or wheezing        Skin Protectants, Misc. (HYDROCERIN) LOTN Apply to  right lower leg topically as needed for dry skin apply to right leg once a day       sodium chloride (OCEAN) 0.65 % nasal spray Spray 2 sprays into both nostrils 2 times daily       Furosemide (LASIX PO) Take 20 mg by mouth 2 times daily        POTASSIUM CHLORIDE PO Take 10 mEq by mouth 2 times daily        triamcinolone (KENALOG) 0.1 % cream Apply topically 2 times daily Apply to calves       Acetaminophen (TYLENOL PO) Take 1,000 mg by mouth 3 times daily       traMADol (ULTRAM) 25 MG TABS Take 25 mg by mouth daily Before BF scheduled.       METFORMIN HCL PO Take 1,000 mg by mouth 2 times daily (with meals)        polyvinyl alcohol (LIQUIFILM TEARS) 1.4 % ophthalmic solution Place 1 drop into both eyes 2 times daily       Mirtazapine (REMERON PO) Take 15 mg by mouth At Bedtime            REVIEW OF SYSTEMS:  Reliability questionable secondary to cognitive impairment. Denies chest pain, shortness of breath, fevers, chills, headache, nausea, vomiting, dysuria or bowel abnormalities. No pain      Physical Exam:  /71  Pulse 62  Temp 97.2  F (36.2  C)  Resp 18  Wt 174 lb (78.9 kg)  SpO2 95%  BMI 26.46 kg/m2   GENERAL APPEARANCE:  Alert, in no distress  ENT:  Mouth and posterior oropharynx normal, moist mucous membranes, [place]  EYES:  EOM, conjunctivae, lids, pupils and irises normal  NECK:  No adenopathy,masses or thyromegaly  RESP:  respiratory effort and palpation of chest normal, lungs clear to auscultation , no respiratory distress  CV:  Palpation and auscultation of heart done , regular rate and rhythm, no murmur, rub, or gallop. ACE wraps on  ABDOMEN:  normal bowel sounds, soft, nontender, no hepatosplenomegaly or other masses  SKIN: L shin with superficial open area, not viewed, no concerns per staff  PSYCH:  oriented to self, insight and judgement impaired, memory impaired , affect abnormal flat       Recent Labs:      CBC RESULTS:   Recent Labs   Lab Test 12/14/16 11/25/16   WBC  9.6  4.5   RBC  3.82*   4.06   HGB  11.7*  12.8*   HCT  36.7*  37.7   MCV  96  93   MCH  30.6  31.5   MCHC  31.9  34.0   RDW  14.0  12.9   PLT  397  246       Last Basic Metabolic Panel:  Recent Labs   Lab Test 12/14/16 11/25/16 02/14/14   0610   NA  139  139   < >  132*   POTASSIUM  4.3  4.0   < >  3.9   CHLORIDE  102  102   < >  98   ELEANOR  8.6  8.9   < >  7.7*   CO2  30   --    --   33*   BUN  16  22   < >  24   CR  0.89  0.95   < >  0.69   GLC  109*  192*   < >  232*    < > = values in this interval not displayed.       Liver Function Studies -   Recent Labs   Lab Test 07/13/16 02/09/14   1955   PROTTOTAL  6.4  4.9*   ALBUMIN  3.2  2.3*   BILITOTAL  0.4  0.4   ALKPHOS  106  100   AST  18  30   ALT  20  46       Lab Results   Component Value Date    A1C 8.7 12/14/2016    A1C 8.6 09/21/2016           Assessment/Plan:  (E11.65) Type 2 diabetes mellitus with hyperglycemia, unspecified long term insulin use status (H)  (primary encounter diagnosis)  Comment: Ongoing intermittent hypoglycemia. HgA1c <8% not recommended in elderly due to risk of hypoglycemia. Risk outweighs benefit of tighter control.   Plan: D/C lantus. Increase breakfast humalog to 5 units, continue 3 units at lunch and dinner. BGM QID. Adjust medication as clinically indicated.    (R41.82) Altered mental status, unspecified altered mental status type  Comment: Difficult to assess whether this is acute due to illness vs progression of dementia. No evidence of PNA or cellulitis  Plan: D/C trazodone. CBC, BMP    (F03.91) Dementia with behavioral disturbance, unspecified dementia type  Comment: Chronic, progressive. Needs 24 hour skilled nursing care. HPI/ROS unreliable with cognitive impairment. New behavioral concerns are causing the patient emotional harm. If this continues, antipsychotic medication would be warrented, likely for a few months with hopes to taper off. Expect further functional and cognitive decline. Expect weight loss.  Plan: Continue 24 hour care. Monitor  weight. Monitor functional status. Monitor for behavioral disturbances, if ongoing for 2 more weeks, will start risperidone      Orders:  D/C trazodone  D/C elliot  BMP, CBC 5/11/17    Total time spent with patient visit was 45 min including patient visit and phone call to patient contact. Greater than 50% of total time spent with counseling and coordinating care due to altered mental status.       Electronically signed by  DRE Nicholson [facility]  Pager: [phone]

## 2017-05-11 ENCOUNTER — TRANSFERRED RECORDS (OUTPATIENT)
Dept: HEALTH INFORMATION MANAGEMENT | Facility: CLINIC | Age: 82
End: 2017-05-11

## 2017-05-11 LAB
ANION GAP SERPL CALCULATED.3IONS-SCNC: 8 MMOL/L (ref 3–14)
BUN SERPL-MCNC: 20 MG/DL (ref 7–30)
CALCIUM SERPL-MCNC: 9.1 MG/DL (ref 8.5–10.1)
CHLORIDE SERPLBLD-SCNC: 104 MMOL/L (ref 94–109)
CO2 SERPL-SCNC: 28 MMOL/L (ref 20–32)
CREAT SERPL-MCNC: 0.83 MG/DL (ref 0.66–1.25)
ERYTHROCYTE [DISTWIDTH] IN BLOOD BY AUTOMATED COUNT: 14.4 % (ref 10–15)
GFR SERPL CREATININE-BSD FRML MDRD: 87 ML/MIN/1.73M2
GLUCOSE SERPL-MCNC: 199 MG/DL (ref 70–99)
HCT VFR BLD AUTO: 37.1 % (ref 40–53)
HEMOGLOBIN: 12.2 G/DL (ref 13.3–17.7)
MCH RBC QN AUTO: 30.3 PG (ref 26.5–33)
MCHC RBC AUTO-ENTMCNC: 32.9 G/DL (ref 31.5–36.5)
MCV RBC AUTO: 92 FL (ref 78–100)
PLATELET # BLD AUTO: 266 10E9/L (ref 150–450)
POTASSIUM SERPL-SCNC: 4 MMOL/L (ref 3.4–5.3)
RBC # BLD AUTO: 4.03 10E12/L (ref 4.4–5.9)
SODIUM SERPL-SCNC: 140 MMOL/L (ref 133–144)
WBC # BLD AUTO: 7.6 10E9/L (ref 4–11)

## 2017-06-28 ENCOUNTER — TRANSFERRED RECORDS (OUTPATIENT)
Dept: HEALTH INFORMATION MANAGEMENT | Facility: CLINIC | Age: 82
End: 2017-06-28

## 2017-06-28 LAB
ANION GAP SERPL CALCULATED.3IONS-SCNC: 9 MMOL/L (ref 3–14)
BUN SERPL-MCNC: 17 MG/DL (ref 7–30)
CALCIUM SERPL-MCNC: 9.4 MG/DL (ref 8.5–10.1)
CHLORIDE SERPLBLD-SCNC: 105 MMOL/L (ref 94–109)
CO2 SERPL-SCNC: 28 MMOL/L (ref 20–32)
CREAT SERPL-MCNC: 0.8 MG/DL (ref 0.66–1.25)
ERYTHROCYTE [DISTWIDTH] IN BLOOD BY AUTOMATED COUNT: 15.1 % (ref 10–15)
GFR SERPL CREATININE-BSD FRML MDRD: >90 ML/MIN/1.73M2
GLUCOSE SERPL-MCNC: 80 MG/DL (ref 70–99)
HBA1C MFR BLD: 7.1 % (ref 4.3–6)
HCT VFR BLD AUTO: 38.3 % (ref 40–53)
HEMOGLOBIN: 12.3 G/DL (ref 13.3–17.7)
MCH RBC QN AUTO: 30.2 PG (ref 26.5–33)
MCHC RBC AUTO-ENTMCNC: 32.1 G/DL (ref 31.5–36.5)
MCV RBC AUTO: 94 FL (ref 78–100)
PLATELET # BLD AUTO: 302 10E9/L (ref 150–450)
POTASSIUM SERPL-SCNC: 3.5 MMOL/L (ref 3.4–5.3)
RBC # BLD AUTO: 4.07 10E12/L (ref 4.4–5.9)
SODIUM SERPL-SCNC: 142 MMOL/L (ref 133–144)
WBC # BLD AUTO: 8.5 10E9/L (ref 4–11)

## 2017-07-18 ENCOUNTER — NURSING HOME VISIT (OUTPATIENT)
Dept: GERIATRICS | Facility: CLINIC | Age: 82
End: 2017-07-18
Payer: MEDICARE

## 2017-07-18 VITALS
OXYGEN SATURATION: 94 % | BODY MASS INDEX: 26 KG/M2 | DIASTOLIC BLOOD PRESSURE: 66 MMHG | HEART RATE: 53 BPM | SYSTOLIC BLOOD PRESSURE: 112 MMHG | RESPIRATION RATE: 18 BRPM | WEIGHT: 171 LBS | TEMPERATURE: 96.7 F

## 2017-07-18 DIAGNOSIS — I10 BENIGN ESSENTIAL HYPERTENSION: ICD-10-CM

## 2017-07-18 DIAGNOSIS — F03.91 DEMENTIA WITH BEHAVIORAL DISTURBANCE, UNSPECIFIED DEMENTIA TYPE: ICD-10-CM

## 2017-07-18 DIAGNOSIS — R60.0 BILATERAL LOWER EXTREMITY EDEMA: Primary | ICD-10-CM

## 2017-07-18 PROCEDURE — 99207 ZZC CDG-CORRECTLY CODED, REVIEWED AND AGREE: CPT | Performed by: INTERNAL MEDICINE

## 2017-07-18 PROCEDURE — 99309 SBSQ NF CARE MODERATE MDM 30: CPT | Performed by: INTERNAL MEDICINE

## 2017-07-18 NOTE — PROGRESS NOTES
Bed 705-7 call @ [phone]   [facility]  Nursing Home Regulatory Visit  July 18, 2017    Chief Complaint   Patient presents with     FCI Regulatory       HPI:    Titus Seay is a 89 year old  (10/27/1927), who is being seen today for a federally mandated E/M visit at [facility]. Today's concerns are:    1) Chronic Bilateral LE Edema -- managed with furosemide 20 mg BID. Appears at baseline today.   2) Dementia With Behavioral Disturbance -- overall decline since I last saw him. Oriented to self. Managed with mirtazapine 15 mg qhs  3) HTN -- By history. SBPs 110s-140s. Not on any anti-HTN medications.     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 2012      URI (upper respiratory infection)     sore throat, cough, hoarse - started last night - worse today      Wenckebach's incomplete AV block        PAST SURGICAL HISTORY:   Past Surgical History:   Procedure Laterality Date     C REMOVE EYE W IMPLANT  1995    right eye     C TOTAL KNEE ARTHROPLASTY  8/2010     COLONOSCOPY       DRAIN SKIN ABSCESS SIMPLE  7/2007    right foot     EXCISE MASS HEAD  11/15/2012    Procedure: EXCISE MASS HEAD;  EXCISION OF SCALP MASSES TIMES TWO;   Surgeon: Stevan Gonsales MD;  Location: [facility] OR     FUSION CERVICAL POSTERIOR ONE LEVEL  10/14/2013    Procedure: FUSION CERVICAL POSTERIOR ONE LEVEL;  Cervical 1 - 2 Posterior Cervical Fusions with neuro monitoring, and Dorsal Slit of Foreskin;  Surgeon: Miah Hutson MD;  Location:  OR       FAMILY HISTORY:   Family History   Problem Relation Age of Onset     Alzheimer Disease Father        at age 79 of Pneumonia     Cancer - colorectal Mother       at age 79     Breast Cancer Sister       at age 44      DIABETES Mother      DIABETES Brother       at age 70      Circulatory Brother       of an anurysm at age 70     DIABETES Sister      C.A.D. Brother      He  suddenly     Anesthesia Reaction No family hx of      OSTEOPOROSIS No family hx of      Thyroid Disease No family hx of        SOCIAL HISTORY:   Lives in a SNF    MEDICATIONS:  Current Outpatient Prescriptions   Medication Sig Dispense Refill     Insulin Lispro (HUMALOG SC) Inject 3 Units Subcutaneous 2 times daily With lunch and dinner. Give 5 units SC qday.       PREDNISONE PO Take 2.5 mg by mouth daily       ipratropium - albuterol 0.5 mg/2.5 mg/3 mL (DUONEB) 0.5-2.5 (3) MG/3ML nebulization Take 1 vial by nebulization 4 times daily as needed for shortness of breath / dyspnea or wheezing        Skin Protectants, Misc. (HYDROCERIN) LOTN Apply to right lower leg topically as needed for dry skin apply to right leg once a day       sodium chloride (OCEAN) 0.65 % nasal spray Spray 2 sprays into both nostrils 2 times daily       Furosemide (LASIX PO) Take 20 mg by mouth 2 times daily        POTASSIUM CHLORIDE PO Take 10 mEq by mouth 2 times daily        triamcinolone (KENALOG) 0.1 % cream Apply topically 2 times daily Apply to calves       Acetaminophen (TYLENOL PO) Take 1,000 mg by mouth 3 times daily       traMADol (ULTRAM) 25 MG TABS Take 25 mg by mouth daily Before BF scheduled.       METFORMIN HCL PO Take 1,000 mg by mouth  2 times daily (with meals)        polyvinyl alcohol (LIQUIFILM TEARS) 1.4 % ophthalmic solution Place 1 drop into both eyes 2 times daily       Mirtazapine (REMERON PO) Take 15 mg by mouth At Bedtime          Medications reviewed:  Medications reconciled to facility chart and changes were made to reflect current medications as identified as above med list. Below are the changes that were made:   Medications stopped since last EPIC medication reconciliation:   There are no discontinued medications.  Medications started since last [facility] medication reconciliation:  No orders of the defined types were placed in this encounter.      Case Management:  I have reviewed the care plan and MDS and do agree with the plan.   Information reviewed:  Medications, vital signs, orders, and nursing notes.    ROS:  Unable to be obtained due to cognitive impairment or aphasia.     PHYSICAL EXAM:  /66  Pulse 53  Temp 96.7  F (35.9  C)  Resp 18  Wt 171 lb (77.6 kg)  SpO2 94%  BMI 26 kg/m2  Gen: sitting in recliner, alert and in no acute distress  Resp: breathing non-labored  GI: abdomen soft  Ext: baseline bilateral dependent LE edema  Neuro: CX II-XII grossly in tact; ROM in all four extremities grossly in tact  Psych: memory, judgement and insight impaired    Lab/Diagnostic data:  Reviewed as per Epic    ASSESSMENT/PLAN    1) Chronic Bilateral LE Edema   At baseline today  -- managed with furosemide 20 mg BID  -- elevate, follow clinically    2) Dementia With Behavioral Disturbance   Overall decline since I last saw him. Oriented to self.   -- continues on mirtazapine 15 mg qhs  -- ongoing 24/7 nursing and supportive cares    3) HTN   By history. SBPs 110s-140s. Not on any anti-HTN medications.   -- follow clinically     Titus Seay is stable. No concerns per nursing. No changes to plan of care today.    Electronically signed by:  Miya Crisostomo MD

## 2017-07-27 ENCOUNTER — DOCUMENTATION ONLY (OUTPATIENT)
Dept: OTHER | Facility: CLINIC | Age: 82
End: 2017-07-27

## 2017-07-27 DIAGNOSIS — Z71.89 ACP (ADVANCE CARE PLANNING): Chronic | ICD-10-CM

## 2017-09-13 ENCOUNTER — NURSING HOME VISIT (OUTPATIENT)
Dept: GERIATRICS | Facility: CLINIC | Age: 82
End: 2017-09-13
Payer: MEDICARE

## 2017-09-13 VITALS
RESPIRATION RATE: 18 BRPM | TEMPERATURE: 97.6 F | HEART RATE: 86 BPM | SYSTOLIC BLOOD PRESSURE: 116 MMHG | DIASTOLIC BLOOD PRESSURE: 67 MMHG | BODY MASS INDEX: 26.15 KG/M2 | OXYGEN SATURATION: 97 % | WEIGHT: 172 LBS

## 2017-09-13 DIAGNOSIS — E11.65 TYPE 2 DIABETES MELLITUS WITH HYPERGLYCEMIA, UNSPECIFIED LONG TERM INSULIN USE STATUS: Primary | ICD-10-CM

## 2017-09-13 DIAGNOSIS — G70.00 MYASTHENIA GRAVIS (H): ICD-10-CM

## 2017-09-13 DIAGNOSIS — R06.2 WHEEZING: ICD-10-CM

## 2017-09-13 DIAGNOSIS — F03.90 DEMENTIA WITHOUT BEHAVIORAL DISTURBANCE, UNSPECIFIED DEMENTIA TYPE: ICD-10-CM

## 2017-09-13 DIAGNOSIS — I10 BENIGN ESSENTIAL HYPERTENSION: ICD-10-CM

## 2017-09-13 DIAGNOSIS — R60.0 BILATERAL LOWER EXTREMITY EDEMA: ICD-10-CM

## 2017-09-13 PROCEDURE — 99309 SBSQ NF CARE MODERATE MDM 30: CPT | Mod: GW | Performed by: NURSE PRACTITIONER

## 2017-09-13 NOTE — PROGRESS NOTES
[facility]    Chief Complaint   Patient presents with     jail Regulatory       HPI:    Titus Seay is a 89 year old  (10/27/1927), who is being seen today for a federally mandated E/M visit at [facility].      HPI information obtained from: facility chart records, facility staff and patient report.      Today's concerns are:  Type 2 diabetes mellitus with hyperglycemia, unspecified long term insulin use status (H)  Last BG Levels:    AM: 199, 163, 167, 166, 160, 167, 170    Noon: 231, 240, 194, 167, 219, 172, 196    Dinner: 182, 269, 242, 118, 259, 125, 177    Lab Results   Component Value Date    A1C 7.1 06/28/2017    A1C 8.7 12/14/2016       Dementia without behavioral disturbance, unspecified dementia type  Some decline in the past year, with worsening memory, confusion, dysphagia.    Benign essential hypertension  On lasix only  BP readings range:  116/67  112/80  111/70  129/86  116/70  113/67  119/65  115/68  130/77  118/67  112/66    Bilateral lower extremity edema  Chronic  Wt Readings from Last 4 Encounters:   09/13/17 172 lb (78 kg)   07/18/17 171 lb (77.6 kg)   05/10/17 174 lb (78.9 kg)   05/03/17 174 lb (78.9 kg)       Wheezing  Currently has prn nebs, has not used one in some time. He says he does get SOB sometimes.    Myasthenia gravis (H)  Have been slowly weaning off prednisone due to frequent wounds, edema, overall goals of comfort care      ALLERGIES: Sulfa drugs and Bactrim [sulfamethoxazole w/trimethoprim]  PAST MEDICAL HISTORY:  has a past medical history of Arthritis; BUNION- right (3/17/2008); Cataract, left; Cervical spine tumor (10/7/2013); Coronary artery disease; Dementia; Diabetic macular edema(362.07) (H); History of eye removal (1989); Hypertension; Myasthenia gravis (H) (12/2013); Nonproliferative diabetic retinopathy NOS(362.03) (H); PE (pulmonary embolism) (H); TIA (transient ischaemic attack) (2/2012); Type II or unspecified type diabetes  mellitus without mention of complication, not stated as uncontrolled; Unspecified cerebral artery occlusion with cerebral infarction; URI (upper respiratory infection); and Wenckebach's incomplete AV block. He also has no past medical history of Thyroid disease.  PAST SURGICAL HISTORY:  has a past surgical history that includes drain skin abscess simple (7/2007); REMOVE EYE W IMPLANT (1995); colonoscopy; Excise mass head (11/15/2012); TOTAL KNEE ARTHROPLASTY (8/2010); and Fusion cervical posterior one level (10/14/2013).  FAMILY HISTORY: family history includes Alzheimer Disease in his father; Breast Cancer in his sister; C.A.D. in his brother; Cancer - colorectal in his mother; Circulatory in his brother; DIABETES in his brother, mother, and sister. There is no history of Anesthesia Reaction, OSTEOPOROSIS, or Thyroid Disease.  SOCIAL HISTORY:  reports that he quit smoking about 33 years ago. His smoking use included Pipe. He quit after 30.00 years of use. He has never used smokeless tobacco. He reports that he does not drink alcohol or use illicit drugs.    MEDICATIONS:  Current Outpatient Prescriptions   Medication Sig Dispense Refill     White Petrolatum ointment as needed for dry skin Apply to Lower extrmities topically as needed for Wound qd prn       Insulin Lispro (HUMALOG SC) Inject 3 Units Subcutaneous 2 times daily With lunch and dinner. Give 5 units SC qday.       PREDNISONE PO Take 2.5 mg by mouth daily       ipratropium - albuterol 0.5 mg/2.5 mg/3 mL (DUONEB) 0.5-2.5 (3) MG/3ML nebulization Take 1 vial by nebulization 4 times daily as needed for shortness of breath / dyspnea or wheezing        Skin Protectants, Misc. (HYDROCERIN) LOTN Apply to right lower leg topically as needed for dry skin apply to right leg once a day       sodium chloride (OCEAN) 0.65 % nasal spray Spray 2 sprays into both nostrils 2 times daily       Furosemide (LASIX PO) Take 20 mg by mouth 2 times daily        POTASSIUM CHLORIDE  PO Take 10 mEq by mouth 2 times daily        triamcinolone (KENALOG) 0.1 % cream Apply topically 2 times daily Apply to calves       Acetaminophen (TYLENOL PO) Take 1,000 mg by mouth 3 times daily       traMADol (ULTRAM) 25 MG TABS Take 25 mg by mouth daily Before BF scheduled.       METFORMIN HCL PO Take 1,000 mg by mouth 2 times daily (with meals)        polyvinyl alcohol (LIQUIFILM TEARS) 1.4 % ophthalmic solution Place 1 drop into both eyes 2 times daily       Mirtazapine (REMERON PO) Take 15 mg by mouth At Bedtime        Medications reviewed:  Medications reconciled to facility chart and changes were made to reflect current medications as identified as above med list.     Case Management:  I have reviewed the care plan and MDS and do agree with the plan. Patient's desire to return to the community is not present.  Information reviewed:  Medications, vital signs, orders, and nursing notes.    ROS:  Reliability questionable secondary to cognitive impairment. Denies chest pain, fevers, chills, headache, nausea, vomiting, dysuria or bowel abnormalities.     Exam:  Vitals: /67  Pulse 86  Temp 97.6  F (36.4  C)  Resp 18  Wt 172 lb (78 kg)  SpO2 97%  BMI 26.15 kg/m2  BMI= Body mass index is 26.15 kg/(m^2).  GENERAL APPEARANCE:  in no distress, sleeping in wheelchair, arousable  ENT:  Mouth and posterior oropharynx normal, moist mucous membranes  EYES:  EOM, conjunctivae, lids, pupils and irises normal  NECK:  No adenopathy,masses or thyromegaly  RESP:  no respiratory distress, rhonchi in bases, mostly clears with cough, wheezing throughout  CV:  Palpation and auscultation of heart done , regular rate and rhythm, no murmur, rub, or gallop, peripheral edema 2+ in BLE  ABDOMEN:  normal bowel sounds, soft, nontender, no hepatosplenomegaly or other masses  SKIN:  Inspection of skin and subcutaneous tissue baseline, Palpation of skin and subcutaneous tissue baseline  PSYCH:  oriented to self, insight and  judgement impaired, memory impaired     Lab/Diagnostic data:      CBC RESULTS:   Recent Labs   Lab Test 06/28/17 05/11/17   WBC  8.5  7.6   RBC  4.07*  4.03*   HGB  12.3*  12.2*   HCT  38.3*  37.1*   MCV  94  92   MCH  30.2  30.3   MCHC  32.1  32.9   RDW  15.1*  14.4   PLT  302  266       Last Basic Metabolic Panel:  Recent Labs   Lab Test 06/28/17 05/11/17   NA  142  140   POTASSIUM  3.5  4.0   CHLORIDE  105  104   ELEANOR  9.4  9.1   CO2  28  28   BUN  17  20   CR  0.80  0.83   GLC  80  199*       Liver Function Studies -   Recent Labs   Lab Test 07/13/16 02/09/14   1955   PROTTOTAL  6.4  4.9*   ALBUMIN  3.2  2.3*   BILITOTAL  0.4  0.4   ALKPHOS  106  100   AST  18  30   ALT  20  46       TSH   Date Value Ref Range Status   12/09/2015 2.67 mcU/mL Final   12/26/2013 2.55 0.4 - 5.0 mU/L Final         Lab Results   Component Value Date    A1C 7.1 06/28/2017    A1C 8.7 12/14/2016           ASSESSMENT/PLAN  (E11.65) Type 2 diabetes mellitus with hyperglycemia, unspecified long term insulin use status (H)  (primary encounter diagnosis)  Comment: Well controlled without hypoglycemia. Intermittent BG > 200 is not concerning. HgA1c <8% not recommended in elderly due to risk of hypoglycemia.  Risk outweighs benefit of tighter control.  Plan: Continue current POC with no changes at this time and adjustments as needed.    (F03.90) Dementia without behavioral disturbance, unspecified dementia type  Comment: Chronic, progressive. Needs 24 hour skilled nursing care. HPI/ROS difficult to obtain with cognitive impairment. Expect further functional and cognitive decline. Expect weight loss.  Plan: Continue 24 hour care. Monitor weight. Monitor functional status. Monitor for behavioral disturbances.    (I10) Benign essential hypertension  Comment: Chronic, controlled on lasix alone  Plan: Continue current POC with no changes at this time and adjustments as needed.    (R60.0) Bilateral lower extremity edema  Comment: Chronic condition  being managed with medications.  Plan: Continue current POC with no changes at this time and adjustments as needed.    (R06.2) Wheezing  Comment: Likely restrictive lung disease related to obesity, poor posture, shallow inspiration, dysphagia  Plan: Duoneb BID indefinitely    (G70.00) Myasthenia gravis (H)  Comment: Difficult to determine if decline, dysphagia is related to dementia vs myasthenia, but dementia is certainly progressing. Due to comfort care goals, it is in his best interest to d/c the prednisone due to side effects  Plan: D/C prednisone      Orders:  D/C prednisone  Duoneb BID         Electronically signed by:  DRE Nicholson [facility]  Pager: [phone]

## 2017-09-20 ENCOUNTER — TRANSFERRED RECORDS (OUTPATIENT)
Dept: HEALTH INFORMATION MANAGEMENT | Facility: CLINIC | Age: 82
End: 2017-09-20

## 2017-09-20 LAB
ANION GAP SERPL CALCULATED.3IONS-SCNC: 8 MMOL/L (ref 3–14)
BUN SERPL-MCNC: 16 MG/DL (ref 7–30)
CALCIUM SERPL-MCNC: 9.2 MG/DL (ref 8.5–10.1)
CHLORIDE SERPLBLD-SCNC: 103 MMOL/L (ref 94–109)
CO2 SERPL-SCNC: 30 MMOL/L (ref 20–32)
CREAT SERPL-MCNC: 0.94 MG/DL (ref 0.66–1.25)
ERYTHROCYTE [DISTWIDTH] IN BLOOD BY AUTOMATED COUNT: 13.5 % (ref 10–15)
GFR SERPL CREATININE-BSD FRML MDRD: 75 ML/MIN/1.73M2
GLUCOSE SERPL-MCNC: 129 MG/DL (ref 70–99)
HBA1C MFR BLD: 8 % (ref 4.3–6)
HCT VFR BLD AUTO: 35.4 % (ref 40–53)
HEMOGLOBIN: 11.4 G/DL (ref 13.3–17.7)
MCH RBC QN AUTO: 30.6 PG (ref 26.5–33)
MCHC RBC AUTO-ENTMCNC: 32.2 G/DL (ref 31.5–36.5)
MCV RBC AUTO: 95 FL (ref 78–100)
PLATELET # BLD AUTO: 268 10E9/L (ref 150–450)
POTASSIUM SERPL-SCNC: 3.7 MMOL/L (ref 3.4–5.3)
RBC # BLD AUTO: 3.72 10E12/L (ref 4.4–5.9)
SODIUM SERPL-SCNC: 141 MMOL/L (ref 133–144)
WBC # BLD AUTO: 7.4 10E9/L (ref 4–11)

## 2017-11-07 VITALS
WEIGHT: 168.5 LBS | OXYGEN SATURATION: 96 % | BODY MASS INDEX: 25.62 KG/M2 | RESPIRATION RATE: 20 BRPM | TEMPERATURE: 96.9 F | DIASTOLIC BLOOD PRESSURE: 69 MMHG | SYSTOLIC BLOOD PRESSURE: 108 MMHG | HEART RATE: 99 BPM

## 2017-11-07 RX ORDER — IPRATROPIUM BROMIDE AND ALBUTEROL SULFATE 2.5; .5 MG/3ML; MG/3ML
1 SOLUTION RESPIRATORY (INHALATION) 2 TIMES DAILY
COMMUNITY

## 2017-11-09 ENCOUNTER — NURSING HOME VISIT (OUTPATIENT)
Dept: GERIATRICS | Facility: CLINIC | Age: 82
End: 2017-11-09
Payer: MEDICARE

## 2017-11-09 DIAGNOSIS — Z79.4 TYPE 2 DIABETES MELLITUS WITH COMPLICATION, WITH LONG-TERM CURRENT USE OF INSULIN (H): ICD-10-CM

## 2017-11-09 DIAGNOSIS — E11.8 TYPE 2 DIABETES MELLITUS WITH COMPLICATION, WITH LONG-TERM CURRENT USE OF INSULIN (H): ICD-10-CM

## 2017-11-09 DIAGNOSIS — G70.00 MYASTHENIA GRAVIS WITHOUT EXACERBATION (H): ICD-10-CM

## 2017-11-09 DIAGNOSIS — I10 BENIGN ESSENTIAL HYPERTENSION: Primary | ICD-10-CM

## 2017-11-09 PROCEDURE — 99309 SBSQ NF CARE MODERATE MDM 30: CPT | Performed by: INTERNAL MEDICINE

## 2017-11-09 NOTE — PROGRESS NOTES
[facility]  Nursing Home Regulatory Visit  November 9, 2017    Chief Complaint   Patient presents with     correction Regulatory       HPI:    Titus Seay is a 89 year old  (10/27/1927), who is being seen today for a federally mandated E/M visit at [facility]. Today's concerns are:    1) HTN -- By history. SBPs 110s-120s, most 110s. Not on any anti-HTN medications.  2) Myasthenia Gravis -- Previously followed by Dr. Peña with neurology. Has been tapered off prednisone without any signs of exacerbation.  3) DM, Type II -- Hgb A1c 7.1 in June. Sugars 150s-260s, most <200 on lispro 5 units with breakfast and 2 units BID with lunch and dinner and metformin 1000 mg BID.     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 2012      URI (upper respiratory infection)     sore throat, cough, hoarse - started last night - worse today      Wenckebach's incomplete AV block        PAST SURGICAL HISTORY:   Past Surgical History:   Procedure Laterality Date     C REMOVE EYE W IMPLANT  1995    right eye     C TOTAL KNEE ARTHROPLASTY  8/2010     COLONOSCOPY       DRAIN SKIN ABSCESS SIMPLE  7/2007    right foot     EXCISE  MASS HEAD  11/15/2012    Procedure: EXCISE MASS HEAD;  EXCISION OF SCALP MASSES TIMES TWO;  Surgeon: Stevan Gonsales MD;  Location: [facility]     FUSION CERVICAL POSTERIOR ONE LEVEL  10/14/2013    Procedure: FUSION CERVICAL POSTERIOR ONE LEVEL;  Cervical 1 - 2 Posterior Cervical Fusions with neuro monitoring, and Dorsal Slit of Foreskin;  Surgeon: Miah Hutson MD;  Location: [facility] OR       FAMILY HISTORY:   Family History   Problem Relation Age of Onset     Alzheimer Disease Father        at age 79 of Pneumonia     Cancer - colorectal Mother       at age 79     Breast Cancer Sister       at age 44      DIABETES Mother      DIABETES Brother       at age 70      Circulatory Brother       of an anurysm at age 70     DIABETES Sister      C.A.D. Brother      He  suddenly     Anesthesia Reaction No family hx of      OSTEOPOROSIS No family hx of      Thyroid Disease No family hx of        SOCIAL HISTORY:   Lives in a SNF    MEDICATIONS:  Current Outpatient Prescriptions   Medication Sig Dispense Refill     ipratropium - albuterol 0.5 mg/2.5 mg/3 mL (DUONEB) 0.5-2.5 (3) MG/3ML neb solution Take 1 vial by nebulization 2 times daily       White Petrolatum ointment as needed for dry skin Apply to Lower extrmities topically as needed for Wound qd prn       Insulin Lispro (HUMALOG SC) Inject 3 Units Subcutaneous 2 times daily With lunch and dinner. Give 5 units SC qday.       ipratropium - albuterol 0.5 mg/2.5 mg/3 mL (DUONEB) 0.5-2.5 (3) MG/3ML nebulization Take 1 vial by nebulization 4 times daily as needed for shortness of breath / dyspnea or wheezing        Skin Protectants, Misc. (HYDROCERIN) LOTN Apply to right lower leg topically as needed for dry skin apply to right leg once a day       sodium chloride (OCEAN) 0.65 % nasal spray Spray 2 sprays into both nostrils 2 times daily       Furosemide (LASIX PO) Take 20 mg by mouth 2 times daily        POTASSIUM CHLORIDE PO Take 10 mEq by mouth 2  times daily        triamcinolone (KENALOG) 0.1 % cream Apply topically 2 times daily Apply to calves       Acetaminophen (TYLENOL PO) Take 1,000 mg by mouth 3 times daily       traMADol (ULTRAM) 25 MG TABS Take 25 mg by mouth daily Before BF scheduled.       METFORMIN HCL PO Take 1,000 mg by mouth 2 times daily (with meals)        polyvinyl alcohol (LIQUIFILM TEARS) 1.4 % ophthalmic solution Place 1 drop into both eyes 2 times daily       Mirtazapine (REMERON PO) Take 15 mg by mouth At Bedtime          Medications reviewed:  Medications reconciled to facility chart and changes were made to reflect current medications as identified as above med list. Below are the changes that were made:   Medications stopped since last EPIC medication reconciliation:   Medications Discontinued During This Encounter   Medication Reason     PREDNISONE PO Medication Reconciliation Clean Up     Medications started since last [facility] medication reconciliation:  Orders Placed This Encounter   Medications     ipratropium - albuterol 0.5 mg/2.5 mg/3 mL (DUONEB) 0.5-2.5 (3) MG/3ML neb solution     Sig: Take 1 vial by nebulization 2 times daily         Case Management:  I have reviewed the care plan and MDS and do agree with the plan.   Information reviewed:  Medications, vital signs, orders, and nursing notes.    ROS:  Unable to be obtained due to cognitive impairment or aphasia.     PHYSICAL EXAM:  /69  Pulse 99  Temp 96.9  F (36.1  C)  Resp 20  Wt 168 lb 8 oz (76.4 kg)  SpO2 96%  BMI 25.62 kg/m2  Gen: sitting in recliner in no acute distress  Resp: breathing non-labored  GI: abdomen soft, not-tender  Ext: dependent LE edema  Neuro: CX II-XII grossly in tact; ROM in all four extremities grossly in tact  Psych: memory, judgement and insight impaired    Lab/Diagnostic data:  Reviewed as per Epic    ASSESSMENT/PLAN    1) HTN   By history. SBPs 110s-120s, most 110s. Not on any anti-HTN medications.  -- follow clinically    2)  Myasthenia Gravis   Previously followed by Dr. Peña with neurology. Has been tapered off prednisone without any signs of exacerbation.  -- follow clinically    3) DM, Type II   Hgb A1c 7.1 in June. Sugars 150s-260s, most <200   -- continues on lispro 5 units with breakfast and 2 units BID with lunch and dinner and metformin 1000 mg BID  -- follow sugars and adjust regimen as needed      Titus Seay is stable. No concerns per nursing. No changes to plan of care today.    Electronically signed by:  Miya Crisostomo MD

## 2018-01-01 ENCOUNTER — NURSING HOME VISIT (OUTPATIENT)
Dept: GERIATRICS | Facility: CLINIC | Age: 83
End: 2018-01-01
Payer: MEDICARE

## 2018-01-01 ENCOUNTER — TRANSFERRED RECORDS (OUTPATIENT)
Dept: HEALTH INFORMATION MANAGEMENT | Facility: CLINIC | Age: 83
End: 2018-01-01

## 2018-01-01 VITALS
SYSTOLIC BLOOD PRESSURE: 115 MMHG | TEMPERATURE: 97.7 F | RESPIRATION RATE: 18 BRPM | BODY MASS INDEX: 25.01 KG/M2 | OXYGEN SATURATION: 97 % | DIASTOLIC BLOOD PRESSURE: 71 MMHG | HEART RATE: 96 BPM | WEIGHT: 164.5 LBS

## 2018-01-01 VITALS
BODY MASS INDEX: 25.08 KG/M2 | TEMPERATURE: 97.4 F | RESPIRATION RATE: 18 BRPM | DIASTOLIC BLOOD PRESSURE: 77 MMHG | HEART RATE: 67 BPM | HEIGHT: 68 IN | SYSTOLIC BLOOD PRESSURE: 115 MMHG | OXYGEN SATURATION: 96 % | WEIGHT: 165.5 LBS

## 2018-01-01 VITALS
TEMPERATURE: 99 F | HEIGHT: 68 IN | WEIGHT: 170 LBS | BODY MASS INDEX: 25.76 KG/M2 | RESPIRATION RATE: 18 BRPM | HEART RATE: 83 BPM | OXYGEN SATURATION: 96 % | SYSTOLIC BLOOD PRESSURE: 127 MMHG | DIASTOLIC BLOOD PRESSURE: 79 MMHG

## 2018-01-01 VITALS
WEIGHT: 170 LBS | TEMPERATURE: 98.4 F | SYSTOLIC BLOOD PRESSURE: 119 MMHG | HEART RATE: 70 BPM | HEIGHT: 68 IN | BODY MASS INDEX: 25.76 KG/M2 | RESPIRATION RATE: 18 BRPM | DIASTOLIC BLOOD PRESSURE: 74 MMHG | OXYGEN SATURATION: 100 %

## 2018-01-01 VITALS
DIASTOLIC BLOOD PRESSURE: 61 MMHG | RESPIRATION RATE: 18 BRPM | BODY MASS INDEX: 26.52 KG/M2 | TEMPERATURE: 98 F | WEIGHT: 175 LBS | OXYGEN SATURATION: 100 % | HEART RATE: 68 BPM | SYSTOLIC BLOOD PRESSURE: 104 MMHG | HEIGHT: 68 IN

## 2018-01-01 VITALS
SYSTOLIC BLOOD PRESSURE: 107 MMHG | HEIGHT: 68 IN | WEIGHT: 173 LBS | DIASTOLIC BLOOD PRESSURE: 67 MMHG | OXYGEN SATURATION: 95 % | DIASTOLIC BLOOD PRESSURE: 67 MMHG | OXYGEN SATURATION: 95 % | RESPIRATION RATE: 16 BRPM | HEART RATE: 63 BPM | HEART RATE: 63 BPM | TEMPERATURE: 97.4 F | SYSTOLIC BLOOD PRESSURE: 107 MMHG | RESPIRATION RATE: 16 BRPM | HEIGHT: 68 IN | WEIGHT: 173 LBS | TEMPERATURE: 97.4 F | BODY MASS INDEX: 26.22 KG/M2 | BODY MASS INDEX: 26.22 KG/M2

## 2018-01-01 VITALS
DIASTOLIC BLOOD PRESSURE: 79 MMHG | HEART RATE: 70 BPM | OXYGEN SATURATION: 96 % | TEMPERATURE: 97.4 F | WEIGHT: 165.4 LBS | RESPIRATION RATE: 20 BRPM | BODY MASS INDEX: 25.15 KG/M2 | SYSTOLIC BLOOD PRESSURE: 120 MMHG

## 2018-01-01 VITALS
TEMPERATURE: 98.2 F | BODY MASS INDEX: 24.55 KG/M2 | DIASTOLIC BLOOD PRESSURE: 74 MMHG | OXYGEN SATURATION: 98 % | HEIGHT: 68 IN | RESPIRATION RATE: 16 BRPM | WEIGHT: 162 LBS | HEART RATE: 61 BPM | SYSTOLIC BLOOD PRESSURE: 106 MMHG

## 2018-01-01 VITALS
OXYGEN SATURATION: 100 % | WEIGHT: 162 LBS | BODY MASS INDEX: 24.63 KG/M2 | HEART RATE: 58 BPM | DIASTOLIC BLOOD PRESSURE: 72 MMHG | SYSTOLIC BLOOD PRESSURE: 109 MMHG | TEMPERATURE: 98.3 F | RESPIRATION RATE: 16 BRPM

## 2018-01-01 VITALS
HEART RATE: 58 BPM | WEIGHT: 167.4 LBS | SYSTOLIC BLOOD PRESSURE: 123 MMHG | BODY MASS INDEX: 25.45 KG/M2 | OXYGEN SATURATION: 95 % | DIASTOLIC BLOOD PRESSURE: 70 MMHG | RESPIRATION RATE: 18 BRPM | TEMPERATURE: 97.1 F

## 2018-01-01 DIAGNOSIS — Z79.4 TYPE 2 DIABETES MELLITUS WITH COMPLICATION, WITH LONG-TERM CURRENT USE OF INSULIN (H): ICD-10-CM

## 2018-01-01 DIAGNOSIS — E11.8 TYPE 2 DIABETES MELLITUS WITH COMPLICATION, WITH LONG-TERM CURRENT USE OF INSULIN (H): ICD-10-CM

## 2018-01-01 DIAGNOSIS — E11.65 TYPE 2 DIABETES MELLITUS WITH HYPERGLYCEMIA, UNSPECIFIED LONG TERM INSULIN USE STATUS: Primary | ICD-10-CM

## 2018-01-01 DIAGNOSIS — R13.10 DYSPHAGIA, UNSPECIFIED TYPE: ICD-10-CM

## 2018-01-01 DIAGNOSIS — I10 BENIGN ESSENTIAL HYPERTENSION: Primary | ICD-10-CM

## 2018-01-01 DIAGNOSIS — F03.90 DEMENTIA WITHOUT BEHAVIORAL DISTURBANCE, UNSPECIFIED DEMENTIA TYPE: ICD-10-CM

## 2018-01-01 DIAGNOSIS — R62.7 ADULT FAILURE TO THRIVE: Primary | ICD-10-CM

## 2018-01-01 DIAGNOSIS — R60.0 BILATERAL LOWER EXTREMITY EDEMA: ICD-10-CM

## 2018-01-01 DIAGNOSIS — E11.65 TYPE 2 DIABETES MELLITUS WITH HYPERGLYCEMIA, UNSPECIFIED WHETHER LONG TERM INSULIN USE (H): ICD-10-CM

## 2018-01-01 DIAGNOSIS — I10 BENIGN ESSENTIAL HYPERTENSION: ICD-10-CM

## 2018-01-01 DIAGNOSIS — G70.00 MYASTHENIA GRAVIS (H): ICD-10-CM

## 2018-01-01 DIAGNOSIS — E11.65 TYPE 2 DIABETES MELLITUS WITH HYPERGLYCEMIA, UNSPECIFIED LONG TERM INSULIN USE STATUS: ICD-10-CM

## 2018-01-01 DIAGNOSIS — F03.90 DEMENTIA WITHOUT BEHAVIORAL DISTURBANCE, UNSPECIFIED DEMENTIA TYPE: Primary | ICD-10-CM

## 2018-01-01 DIAGNOSIS — R60.0 BILATERAL LOWER EXTREMITY EDEMA: Primary | ICD-10-CM

## 2018-01-01 DIAGNOSIS — R60.0 BILATERAL LEG EDEMA: Primary | ICD-10-CM

## 2018-01-01 DIAGNOSIS — E11.65 TYPE 2 DIABETES MELLITUS WITH HYPERGLYCEMIA, UNSPECIFIED WHETHER LONG TERM INSULIN USE (H): Primary | ICD-10-CM

## 2018-01-01 DIAGNOSIS — G70.00 MYASTHENIA GRAVIS WITHOUT EXACERBATION (H): ICD-10-CM

## 2018-01-01 DIAGNOSIS — I95.9 HYPOTENSION, UNSPECIFIED HYPOTENSION TYPE: ICD-10-CM

## 2018-01-01 LAB
ANION GAP SERPL CALCULATED.3IONS-SCNC: 7 MMOL/L (ref 3–14)
ANION GAP SERPL CALCULATED.3IONS-SCNC: 7 MMOL/L (ref 3–14)
ANION GAP SERPL CALCULATED.3IONS-SCNC: 9 MMOL/L (ref 3–14)
BUN SERPL-MCNC: 24 MG/DL (ref 7–30)
BUN SERPL-MCNC: 26 MG/DL (ref 7–30)
BUN SERPL-MCNC: 28 MG/DL (ref 7–30)
CALCIUM SERPL-MCNC: 9.1 MG/DL (ref 8.5–10.1)
CALCIUM SERPL-MCNC: 9.3 MG/DL (ref 8.5–10.1)
CALCIUM SERPL-MCNC: 9.4 MG/DL (ref 8.5–10.1)
CHLORIDE SERPLBLD-SCNC: 102 MMOL/L (ref 94–109)
CHLORIDE SERPLBLD-SCNC: 104 MMOL/L (ref 94–109)
CHLORIDE SERPLBLD-SCNC: 105 MMOL/L (ref 94–109)
CO2 SERPL-SCNC: 28 MMOL/L (ref 20–32)
CO2 SERPL-SCNC: 28 MMOL/L (ref 20–32)
CO2 SERPL-SCNC: 30 MMOL/L (ref 20–32)
CREAT SERPL-MCNC: 1.05 MG/DL (ref 0.66–1.25)
CREAT SERPL-MCNC: 1.09 MG/DL (ref 0.66–1.25)
CREAT SERPL-MCNC: 1.16 MG/DL (ref 0.66–1.25)
ERYTHROCYTE [DISTWIDTH] IN BLOOD BY AUTOMATED COUNT: 15.1 % (ref 10–15)
GFR SERPL CREATININE-BSD FRML MDRD: 59 ML/MIN/1.7M2
GFR SERPL CREATININE-BSD FRML MDRD: 63 ML/MIN/1.73M2
GFR SERPL CREATININE-BSD FRML MDRD: 66 ML/MIN/1.7M2
GLUCOSE SERPL-MCNC: 111 MG/DL (ref 70–99)
GLUCOSE SERPL-MCNC: 129 MG/DL (ref 70–99)
GLUCOSE SERPL-MCNC: 155 MG/DL (ref 70–99)
HBA1C MFR BLD: 7.1 % (ref 0–5.7)
HBA1C MFR BLD: 7.3 % (ref 0–5.6)
HCT VFR BLD AUTO: 38.8 % (ref 40–53)
HEMOGLOBIN: 12.5 G/DL (ref 13.3–17.7)
MCH RBC QN AUTO: 29.7 PG (ref 26.5–33)
MCHC RBC AUTO-ENTMCNC: 32.2 G/DL (ref 31.5–36.5)
MCV RBC AUTO: 92 FL (ref 78–100)
PLATELET # BLD AUTO: 268 10E9/L (ref 150–450)
POTASSIUM SERPL-SCNC: 4 MMOL/L (ref 3.4–5.3)
POTASSIUM SERPL-SCNC: 4.1 MMOL/L (ref 3.4–5.3)
POTASSIUM SERPL-SCNC: 4.3 MMOL/L (ref 3.4–5.3)
RBC # BLD AUTO: 4.21 10E12/L (ref 4.4–5.9)
SODIUM SERPL-SCNC: 139 MMOL/L (ref 133–144)
SODIUM SERPL-SCNC: 140 MMOL/L (ref 133–144)
SODIUM SERPL-SCNC: 141 MMOL/L (ref 133–144)
WBC # BLD AUTO: 7.4 10E9/L (ref 4–11)

## 2018-01-01 PROCEDURE — 99309 SBSQ NF CARE MODERATE MDM 30: CPT | Performed by: NURSE PRACTITIONER

## 2018-01-01 PROCEDURE — 99309 SBSQ NF CARE MODERATE MDM 30: CPT | Mod: GW | Performed by: NURSE PRACTITIONER

## 2018-01-01 PROCEDURE — 99309 SBSQ NF CARE MODERATE MDM 30: CPT | Mod: GW | Performed by: INTERNAL MEDICINE

## 2018-01-01 PROCEDURE — 99308 SBSQ NF CARE LOW MDM 20: CPT | Performed by: NURSE PRACTITIONER

## 2018-01-01 PROCEDURE — 99309 SBSQ NF CARE MODERATE MDM 30: CPT | Performed by: INTERNAL MEDICINE

## 2018-01-01 PROCEDURE — 99318 ZZC ANNUAL NURSING FAC ASSESSMNT, STABLE: CPT | Performed by: NURSE PRACTITIONER

## 2018-01-01 PROCEDURE — 99310 SBSQ NF CARE HIGH MDM 45: CPT | Mod: GW | Performed by: NURSE PRACTITIONER

## 2018-01-01 RX ORDER — POTASSIUM CHLORIDE 750 MG/1
20 TABLET, EXTENDED RELEASE ORAL 2 TIMES DAILY
Qty: 30 TABLET | Refills: 1
Start: 2018-01-01 | End: 2018-01-01

## 2018-01-01 RX ORDER — FUROSEMIDE 20 MG
40 TABLET ORAL 2 TIMES DAILY
Start: 2018-01-01 | End: 2018-01-01

## 2018-01-01 RX ORDER — FUROSEMIDE 20 MG
40 TABLET ORAL 2 TIMES DAILY
Qty: 30 TABLET
Start: 2018-01-01 | End: 2018-01-01 | Stop reason: DRUGHIGH

## 2018-01-01 RX ORDER — POTASSIUM CHLORIDE 750 MG/1
10 TABLET, EXTENDED RELEASE ORAL 2 TIMES DAILY
Qty: 30 TABLET | Refills: 1
Start: 2018-01-01 | End: 2018-01-01

## 2018-01-01 RX ORDER — POTASSIUM CHLORIDE 750 MG/1
20 TABLET, EXTENDED RELEASE ORAL 2 TIMES DAILY
Qty: 30 TABLET | Refills: 1
Start: 2018-01-01 | End: 2018-01-01 | Stop reason: DRUGHIGH

## 2018-01-01 RX ORDER — FUROSEMIDE 20 MG
20 TABLET ORAL 2 TIMES DAILY
Qty: 30 TABLET
Start: 2018-01-01 | End: 2018-01-01

## 2018-01-01 ASSESSMENT — MIFFLIN-ST. JEOR: SCORE: 1369.09

## 2018-01-03 ENCOUNTER — NURSING HOME VISIT (OUTPATIENT)
Dept: GERIATRICS | Facility: CLINIC | Age: 83
End: 2018-01-03
Payer: MEDICARE

## 2018-01-03 VITALS
DIASTOLIC BLOOD PRESSURE: 77 MMHG | SYSTOLIC BLOOD PRESSURE: 113 MMHG | TEMPERATURE: 97.5 F | HEART RATE: 77 BPM | RESPIRATION RATE: 14 BRPM | BODY MASS INDEX: 25.61 KG/M2 | WEIGHT: 169 LBS | OXYGEN SATURATION: 97 % | HEIGHT: 68 IN

## 2018-01-03 DIAGNOSIS — R60.0 BILATERAL LOWER EXTREMITY EDEMA: ICD-10-CM

## 2018-01-03 DIAGNOSIS — F03.90 DEMENTIA WITHOUT BEHAVIORAL DISTURBANCE, UNSPECIFIED DEMENTIA TYPE: ICD-10-CM

## 2018-01-03 DIAGNOSIS — G70.00 MYASTHENIA GRAVIS (H): ICD-10-CM

## 2018-01-03 DIAGNOSIS — I10 BENIGN ESSENTIAL HYPERTENSION: ICD-10-CM

## 2018-01-03 DIAGNOSIS — E11.65 TYPE 2 DIABETES MELLITUS WITH HYPERGLYCEMIA, UNSPECIFIED LONG TERM INSULIN USE STATUS: Primary | ICD-10-CM

## 2018-01-03 PROCEDURE — 99318 ZZC ANNUAL NURSING FAC ASSESSMNT, STABLE: CPT | Mod: GW | Performed by: NURSE PRACTITIONER

## 2018-01-03 NOTE — PROGRESS NOTES
[facility]  Chief Complaint   Patient presents with     Annual Comprehensive Nursing Home       HPI:    Titus Seay is a 89 year old  (10/27/1927), who is being seen today for an annual comprehensive visit at [facility].      HPI information obtained from: facility chart records, facility staff and patient report.      Today's concerns are:  Type 2 diabetes mellitus with hyperglycemia, unspecified long term insulin use status (H)  On metformin and prandial insulin  Last BG Levels:    AM: 178, 146, 149, 145, 140, 151, 166    Noon: 211, 91, 197, 224, 188, 219, 159    Dinner: 225, 129, 205, 171, 136, 189        Lab Results   Component Value Date    A1C 7.1 06/28/2017    A1C 8.7 12/14/2016       Dementia without behavioral disturbance, unspecified dementia type  Some decline in the past year, with worsening memory, confusion, dysphagia, but seems to have stabilized now. Occasional falls when trying to transfer independently, otherwise no behavioral concerns per nursing  Wt Readings from Last 2 Encounters:   01/03/18 169 lb (76.7 kg)   11/07/17 168 lb 8 oz (76.4 kg)       Benign essential hypertension  BP readings range:  113/77  123/73  103/65  131/65  106/59  117/70  120/70  141/75  126/73  123/72      Bilateral lower extremity edema  Chronic    Myasthenia gravis (H)  Prednisone has been weaned off over the past year due to edema, frequent skin tears with poor wound healing, cellulitis.         ALLERGIES: Sulfa drugs and Bactrim [sulfamethoxazole w/trimethoprim]  PROBLEM LIST:  Patient Active Problem List   Diagnosis     Nonproliferative diabetic retinopathy (H)     Diabetic macular edema (H)     Wenckebach's incomplete AV block     Hyperlipidemia LDL goal <100     ACP (advance care planning)     DJD (degenerative joint disease) of knee     Cervical spine tumor     Status post cervical spinal fusion     Compression of brainstem (H)     Phimosis     Adjustment disorder with depressed mood      Dysphagia     Syncope and collapse     Myasthenia gravis (H)     GI bleed     OA (osteoarthritis) of knee     Generalized muscle weakness     Left knee pain     Lumbago     Skin tear of left lower leg without complication, sequela     Bilateral lower extremity edema     Benign essential hypertension     Dementia without behavioral disturbance, unspecified dementia type     Type 2 diabetes mellitus with hyperglycemia (H)     PAST MEDICAL HISTORY:  has a past medical history of Arthritis; BUNION- right (3/17/2008); Cataract, left; 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 He also has no past medical history of Thyroid disease.  PAST SURGICAL HISTORY:  has a past surgical history that includes drain skin abscess simple (7/2007); REMOVE EYE W IMPLANT (1995); colonoscopy; Excise mass head (11/15/2012); TOTAL KNEE ARTHROPLASTY (8/2010); and Fusion cervical posterior one level (10/14/2013).  FAMILY HISTORY: family history includes Alzheimer Disease in his father; Breast Cancer in his sister; C.A.D. in his brother; Cancer - colorectal in his mother; Circulatory in his brother; DIABETES in his brother, mother, and sister. There is no history of Anesthesia Reaction, OSTEOPOROSIS, or Thyroid Disease.  SOCIAL HISTORY:  reports that he quit smoking about 34 years ago. His smoking use included Pipe. He quit after 30.00 years of use. He has never used smokeless tobacco. He reports that he does not drink alcohol or use  illicit drugs.  IMMUNIZATIONS:  Most Recent Immunizations   Administered Date(s) Administered     Influenza (High Dose) 3 valent vaccine 10/17/2013     Influenza (IIV3) PF 10/17/2017     Pneumo Conj 13-V (2010&after) 09/25/2015     Pneumococcal 23 valent 12/31/2009     TD (ADULT, 7+) 01/05/2009     Above immunizations pulled from [facility]. [facility] and facility records also reconciled.  Future immunizations are not needed at this point as all recommended immunizations are up to date.   MEDICATIONS:  Current Outpatient Prescriptions   Medication Sig Dispense Refill     ipratropium - albuterol 0.5 mg/2.5 mg/3 mL (DUONEB) 0.5-2.5 (3) MG/3ML neb solution Take 1 vial by nebulization 2 times daily       White Petrolatum ointment as needed for dry skin Apply to Lower extrmities topically as needed for Wound qd prn       Insulin Lispro (HUMALOG SC) Inject 3 Units Subcutaneous 2 times daily With lunch and dinner. Give 5 units SC qday.       ipratropium - albuterol 0.5 mg/2.5 mg/3 mL (DUONEB) 0.5-2.5 (3) MG/3ML nebulization Take 1 vial by nebulization 4 times daily as needed for shortness of breath / dyspnea or wheezing        Skin Protectants, Misc. (HYDROCERIN) LOTN Apply to right lower leg topically as needed for dry skin apply to right leg once a day       sodium chloride (OCEAN) 0.65 % nasal spray Spray 2 sprays into both nostrils 2 times daily       Furosemide (LASIX PO) Take 20 mg by mouth 2 times daily        POTASSIUM CHLORIDE PO Take 10 mEq by mouth 2 times daily        Acetaminophen (TYLENOL PO) Take 1,000 mg by mouth 3 times daily       traMADol (ULTRAM) 25 MG TABS Take 25 mg by mouth daily Before BF scheduled.       METFORMIN HCL PO Take 1,000 mg by mouth 2 times daily (with meals)        polyvinyl alcohol (LIQUIFILM TEARS) 1.4 % ophthalmic solution Place 1 drop into both eyes 2 times daily       Mirtazapine (REMERON PO) Take 22.5 mg by mouth At Bedtime        Medications reviewed:  Medications reconciled to  "facility chart and changes were made to reflect current medications as identified as above med list.     Case Management:  I have reviewed the facility/SNF care plan/MDS which was done 11/30/2017, including the falls risk, nutrition and pain screening. I also reviewed the current immunizations, and preventive care..Future cancer screening is not clinically indicated secondary to age/goals of care Patient's desire to return to the community is not present. Current Level of Care is appropriate.    Advance Directive Discussion:    I reviewed the current advanced directives as reflected in EPIC, the POLST and the facility chart, and verified the congruency of orders. I contacted the first party, daughter Micheline and discussed the plan of Care.  I did not due to cognitive impairment review the advance directives with the resident.     Team Discussion:  I communicated with the appropriate disciplines involved with the Plan of Care:   Nursing      Patient Goal:  Patient's goal is pain control and comfort.    Information reviewed:  Medications, vital signs, orders, and nursing notes.    ROS:  Reliability questionable secondary to cognitive impairment. Denies chest pain, shortness of breath, fevers, chills, headache, nausea, vomiting, dysuria or bowel abnormalities.  Appetite is fine    Exam:  /77  Pulse 77  Temp 97.5  F (36.4  C)  Resp 14  Ht 5' 8\" (1.727 m)  Wt 169 lb (76.7 kg)  SpO2 97%  BMI 25.7 kg/m2  GENERAL APPEARANCE:  in no distress, sleeping in wheelchair, arousable  ENT:  Mouth and posterior oropharynx normal, moist mucous membranes  EYES:  EOM, conjunctivae, lids, pupils and irises normal  NECK:  No adenopathy,masses or thyromegaly  RESP:  no respiratory distress, rhonchi in bases, mostly clears with cough, wheezing throughout  CV:  Palpation and auscultation of heart done , regular rate and rhythm, no murmur, rub, or gallop, peripheral edema 2+ in BLE  ABDOMEN:  normal bowel sounds, soft, nontender, no " hepatosplenomegaly or other masses  SKIN:  Inspection of skin and subcutaneous tissue baseline, Palpation of skin and subcutaneous tissue baseline  PSYCH:  oriented to self, insight and judgement impaired, memory impaired    Lab/Diagnostic data:      CBC RESULTS:   Recent Labs   Lab Test 06/28/17 05/11/17   WBC  8.5  7.6   RBC  4.07*  4.03*   HGB  12.3*  12.2*   HCT  38.3*  37.1*   MCV  94  92   MCH  30.2  30.3   MCHC  32.1  32.9   RDW  15.1*  14.4   PLT  302  266       Last Basic Metabolic Panel:  Recent Labs   Lab Test 06/28/17 05/11/17   NA  142  140   POTASSIUM  3.5  4.0   CHLORIDE  105  104   ELEANOR  9.4  9.1   CO2  28  28   BUN  17  20   CR  0.80  0.83   GLC  80  199*       Liver Function Studies -   Recent Labs   Lab Test 07/13/16 02/09/14   1955   PROTTOTAL  6.4  4.9*   ALBUMIN  3.2  2.3*   BILITOTAL  0.4  0.4   ALKPHOS  106  100   AST  18  30   ALT  20  46       Lab Results   Component Value Date    A1C 7.1 06/28/2017    A1C 8.7 12/14/2016       ASSESSMENT/PLAN  (E11.65) Type 2 diabetes mellitus with hyperglycemia, unspecified long term insulin use status (H)  (primary encounter diagnosis)  Comment: Well controlled based on BGM. HgA1c <8% not recommended in elderly due to risk of hypoglycemia. Risk outweighs benefit of tighter control.   Plan: Continue current POC with no changes at this time and adjustments as needed.    (F03.90) Dementia without behavioral disturbance, unspecified dementia type  Comment: Chronic, progressive. Needs 24 hour skilled nursing care. HPI/ROS unreliable with cognitive impairment. Expect further functional and cognitive decline. Expect weight loss.  Plan: Continue 24 hour care. Monitor weight. Monitor functional status. Monitor for behavioral disturbances.    (I10) Benign essential hypertension  Comment: Controlled. On lasix only. To avoid risk of hypotension, falls, dizziness and tissue hypoperfusion, recommend  BP goal is < 150/90mmHg.  Plan: Continue current POC with no changes  at this time and adjustments as needed.    (R60.0) Bilateral lower extremity edema  Comment: Chronic, reasonably controlled with lasix without evidence of side effects  Plan: Continue current POC with no changes at this time and adjustments as needed.    (G70.00) Myasthenia gravis (H)  Comment: Asymptomatic. Decline from dementia can certainly mimic symptoms of myasthenia gravis. If patient does show any decline in functional status or dysphagia, goal is comfort care. Would not restart prednisone  Plan: Continue current POC with no changes at this time and adjustments as needed.        Electronically signed by:  DRE Nicholson [facility]  Pager: [phone]

## 2018-03-07 VITALS
RESPIRATION RATE: 18 BRPM | DIASTOLIC BLOOD PRESSURE: 63 MMHG | HEART RATE: 65 BPM | WEIGHT: 168 LBS | OXYGEN SATURATION: 98 % | SYSTOLIC BLOOD PRESSURE: 107 MMHG | BODY MASS INDEX: 25.54 KG/M2 | TEMPERATURE: 98.2 F

## 2018-03-07 RX ORDER — NYSTATIN 100000 U/G
CREAM TOPICAL
COMMUNITY

## 2018-03-08 ENCOUNTER — NURSING HOME VISIT (OUTPATIENT)
Dept: GERIATRICS | Facility: CLINIC | Age: 83
End: 2018-03-08
Payer: MEDICARE

## 2018-03-08 DIAGNOSIS — R60.0 BILATERAL LEG EDEMA: ICD-10-CM

## 2018-03-08 DIAGNOSIS — Z79.4 TYPE 2 DIABETES MELLITUS WITHOUT COMPLICATION, WITH LONG-TERM CURRENT USE OF INSULIN (H): Primary | ICD-10-CM

## 2018-03-08 DIAGNOSIS — E11.9 TYPE 2 DIABETES MELLITUS WITHOUT COMPLICATION, WITH LONG-TERM CURRENT USE OF INSULIN (H): Primary | ICD-10-CM

## 2018-03-08 DIAGNOSIS — I10 BENIGN ESSENTIAL HYPERTENSION: ICD-10-CM

## 2018-03-08 PROCEDURE — 99309 SBSQ NF CARE MODERATE MDM 30: CPT | Performed by: INTERNAL MEDICINE

## 2018-03-08 NOTE — PROGRESS NOTES
[facility]  Nursing Home Regulatory Visit  March 8, 2018    Chief Complaint   Patient presents with     care home Regulatory       HPI:    Titus Seay is a 89 year old  (10/27/1927), who is being seen today for a federally mandated E/M visit at [facility]. Today's concerns are:    1) DM, Type II -- Hgb A1c 7.1 in June. Sugars 110s-220s, most 140s-170 on lispro 5 units with breakfast and 2 units BID with lunch and dinner as well as metformin 1000 mg BID.   2) Chronic Bilateral LE Edema -- managed with furosemide 20 mg BID. Appears at baseline today.  3) HTN -- By history. SBPs 110s-120s. He is not on any anti-HTN medications.     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 2012      URI (upper respiratory infection)     sore throat, cough, hoarse - started last night - worse today      Wenckebach's incomplete AV block        PAST SURGICAL HISTORY:   Past Surgical History:   Procedure Laterality Date     C REMOVE EYE W IMPLANT  1995    right eye     C TOTAL KNEE ARTHROPLASTY  8/2010     COLONOSCOPY       DRAIN SKIN ABSCESS SIMPLE  7/2007    right foot     EXCISE MASS HEAD  11/15/2012    Procedure: EXCISE MASS  HEAD;  EXCISION OF SCALP MASSES TIMES TWO;  Surgeon: Stevan Gonsales MD;  Location: [facility] OR     FUSION CERVICAL POSTERIOR ONE LEVEL  10/14/2013    Procedure: FUSION CERVICAL POSTERIOR ONE LEVEL;  Cervical 1 - 2 Posterior Cervical Fusions with neuro monitoring, and Dorsal Slit of Foreskin;  Surgeon: Miah Hutson MD;  Location: [facility] OR       FAMILY HISTORY:   Family History   Problem Relation Age of Onset     Alzheimer Disease Father        at age 79 of Pneumonia     Cancer - colorectal Mother       at age 79     Breast Cancer Sister       at age 44      DIABETES Mother      DIABETES Brother       at age 70      Circulatory Brother       of an anurysm at age 70     DIABETES Sister      C.A.D. Brother      He  suddenly     Anesthesia Reaction No family hx of      OSTEOPOROSIS No family hx of      Thyroid Disease No family hx of        SOCIAL HISTORY:   Lives in a SNF    MEDICATIONS:  Current Outpatient Prescriptions   Medication Sig Dispense Refill     nystatin (MYCOSTATIN) cream Apply to left abdomen topically every morning and at bedtime for redness apply until healed and then change to everyday PRN       ipratropium - albuterol 0.5 mg/2.5 mg/3 mL (DUONEB) 0.5-2.5 (3) MG/3ML neb solution Take 1 vial by nebulization 2 times daily       White Petrolatum ointment as needed for dry skin Apply to Lower extrmities topically as needed for Wound qd prn       Insulin Lispro (HUMALOG SC) Inject 3 Units Subcutaneous 2 times daily With lunch and dinner. Give 5 units SC qday.       ipratropium - albuterol 0.5 mg/2.5 mg/3 mL (DUONEB) 0.5-2.5 (3) MG/3ML nebulization Take 1 vial by nebulization 4 times daily as needed for shortness of breath / dyspnea or wheezing        Skin Protectants, Misc. (HYDROCERIN) LOTN Apply to right lower leg topically as needed for dry skin apply to right leg once a day       sodium chloride (OCEAN) 0.65 % nasal spray Spray 2 sprays into both nostrils 2 times daily        Furosemide (LASIX PO) Take 20 mg by mouth 2 times daily        POTASSIUM CHLORIDE PO Take 10 mEq by mouth 2 times daily        Acetaminophen (TYLENOL PO) Take 1,000 mg by mouth 3 times daily       traMADol (ULTRAM) 25 MG TABS Take 25 mg by mouth daily Before BF scheduled.       METFORMIN HCL PO Take 1,000 mg by mouth 2 times daily (with meals)        polyvinyl alcohol (LIQUIFILM TEARS) 1.4 % ophthalmic solution Place 1 drop into both eyes 2 times daily       Mirtazapine (REMERON PO) Take 22.5 mg by mouth At Bedtime          Medications reviewed:  Medications reconciled to facility chart and changes were made to reflect current medications as identified as above med list. Below are the changes that were made:   Medications stopped since last EPIC medication reconciliation:   There are no discontinued medications.  Medications started since last [facility] medication reconciliation:  Orders Placed This Encounter   Medications     nystatin (MYCOSTATIN) cream     Sig: Apply to left abdomen topically every morning and at bedtime for redness apply until healed and then change to everyday PRN       Case Management:  I have reviewed the care plan and MDS and do agree with the plan.   Information reviewed:  Medications, vital signs, orders, and nursing notes.    ROS:  4 point ROS neg other than the symptoms noted above in the HPI.    PHYSICAL EXAM:  /63  Pulse 65  Temp 98.2  F (36.8  C)  Resp 18  Wt 168 lb (76.2 kg)  SpO2 98%  BMI 25.54 kg/m2  Gen: sitting in chair in no acute distress  Resp: breathing non-labored  GI: abdomen soft, not-tender  Ext: baseline chronic bilateral LE edema  Neuro: CX II-XII grossly in tact; ROM in all four extremities grossly in tact  Psych: alert and oriented to self and general situation; normal affect    Lab/Diagnostic data:  Reviewed as per Epic    ASSESSMENT/PLAN:    1) DM, Type II   Hgb A1c 7.1 in June. Sugars 110s-220s, most 140s-170   -- continues on lispro 5 units with breakfast  and 2 units BID with lunch and dinner as well as metformin 1000 mg BID  -- follow sugars and adjust medications as needed    2) Chronic Bilateral LE Edema  Appears at baseline today.  -- continues on furosemide 20 mg BID    3) HTN   By history. SBPs 110s-120s. He is not on any anti-HTN medications.   -- follow clinically     Electronically signed by:  Miya Crisostomo MD
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
## 2018-05-09 NOTE — LETTER
5/9/2018        RE: Titus Seay  [address] [place] [address]          [facility]    Chief Complaint   Patient presents with     FDC Regulatory       [facility] Medical Record Number:  [id number]    HPI:    Titus Seay is a 89 year old  (10/27/1927), who is being seen today for a federally mandated E/M visit at [facility].      HPI information obtained from: facility chart records, facility staff and patient report.       Today's concerns are:  Type 2 diabetes mellitus with hyperglycemia, unspecified long term insulin use status (H)  On prandial insulin only. 3/28/18 A1c 7.1%  Last BG Levels:    AM: 150, 169, 154, 165, 171, 150, 161    Noon: 170, 157, 280, 153, 141, 205, 326    Dinner: 145, 240, 113, 112, 72, 140, 168  Lab Results   Component Value Date    A1C 7.1 06/28/2017    A1C 8.7 12/14/2016     Dementia without behavioral disturbance, unspecified dementia type  Current regimen Remeron 22.5m po qhs. Hospice consult was sent about a month ago due to functional and cognitive decline and weight loss, but then his daughter decided to hold off on that. Focus is still comfort care. No behavioral issues. Occasionally has a fall when attempting self transfer.    Bilateral lower extremity edema  Lasix increased 3/21/2018 due to worsening BLE edema.  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 He also has no past medical history of Thyroid disease.  PAST SURGICAL HISTORY:  has a past surgical history that includes drain skin abscess simple (7/2007); REMOVE EYE W IMPLANT (1995); colonoscopy; Excise mass head (11/15/2012); TOTAL KNEE ARTHROPLASTY (8/2010); and Fusion cervical posterior one level (10/14/2013).  FAMILY HISTORY: family history includes Alzheimer Disease in his father; Breast Cancer in his sister; C.A.D. in his brother; Cancer - colorectal in his mother; Circulatory in his brother; DIABETES in his brother, mother, and sister. There is no history of Anesthesia Reaction, OSTEOPOROSIS, or Thyroid Disease.  SOCIAL HISTORY:  reports that he quit smoking about 34 years ago. His smoking use included Pipe. He quit after 30.00 years of use. He has never used smokeless tobacco. He reports that he does not drink alcohol or use illicit drugs.    MEDICATIONS:  Current Outpatient Prescriptions   Medication Sig Dispense Refill     Acetaminophen (TYLENOL PO) Take 1,000 mg by mouth 3 times daily       furosemide (LASIX) 20 MG tablet Take 2 tablets (40 mg) by mouth 2 times daily       Insulin Lispro (HUMALOG SC) Inject 3 Units Subcutaneous 2 times daily With lunch and dinner. Give 5 units SC qday.       ipratropium - albuterol 0.5 mg/2.5 mg/3 mL (DUONEB) 0.5-2.5 (3) MG/3ML neb solution Take 1 vial by nebulization 2 times daily       ipratropium - albuterol 0.5 mg/2.5 mg/3 mL (DUONEB) 0.5-2.5 (3) MG/3ML nebulization Take 1 vial by nebulization 4 times daily as needed for shortness of breath / dyspnea or wheezing         METFORMIN HCL PO Take 1,000 mg by mouth 2 times daily (with meals)        Mirtazapine (REMERON PO) Take 22.5 mg by mouth At Bedtime        nystatin (MYCOSTATIN) cream Apply to left abdomen topically every morning and at bedtime for redness apply until healed and then change to everyday PRN       polyvinyl alcohol (LIQUIFILM TEARS) 1.4 % ophthalmic solution Place 1 drop into both eyes 2 times daily       potassium chloride SA (K-DUR/KLOR-CON M) 10 MEQ CR tablet Take 2 tablets (20 mEq) by mouth 2 times daily 30 tablet 1     Skin Protectants, Misc. (HYDROCERIN) LOTN Apply to right lower leg topically as needed for dry skin apply to right leg once a day       sodium chloride (OCEAN) 0.65 % nasal spray Spray 2 sprays into both nostrils 2 times daily       traMADol (ULTRAM) 25 MG TABS Take 25 mg by mouth daily Before BF scheduled.       White Petrolatum ointment as needed for dry skin Apply to Lower extrmities topically as needed for Wound qd prn       Medications reviewed:  Medications reconciled to facility chart and changes were made to reflect current medications as identified as above med list.   Case Management:  I have reviewed the care plan and MDS and do agree with the plan. Patient's desire to return to the community is not present.  Information reviewed:  Medications, vital signs, orders, and nursing notes.    ROS:  Reliability questionable secondary to cognitive impairment. Denies chest pain, shortness of breath, fevers, chills, headache, nausea, vomiting, dysuria or bowel abnormalities.  Appetite is good.  No pain.    Exam:  Vitals: /70  Pulse 58  Temp 97.1  F (36.2  C)  Resp 18  Wt 167 lb 6.4 oz (75.9 kg)  SpO2 95%  BMI 25.45 kg/m2  BMI= Body mass index is 25.45 kg/(m^2).   GENERAL APPEARANCE:  Drowsy, no apparent distress. Appears as though he has lost some weight  ENT:  Mouth and posterior oropharynx normal, moist mucous membranes, [place]  EYES:  EOM, conjunctivae, lids, pupils and irises  normal  NECK:  No adenopathy,masses or thyromegaly  RESP:  no respiratory distress, crackles bases  CV:  Palpation and auscultation of heart done , peripheral edema 3+ in LLE and 2+ RLE, rate-normal, grade 3/6 murmur  ABDOMEN:  normal bowel sounds, soft, nontender, no hepatosplenomegaly or other masses  SKIN:  Skin thin, several bruises  PSYCH:  oriented to self, insight and judgement impaired, memory impaired , affect abnormal flat      Lab/Diagnostic data:   3/28/18  A1c 7.1%  Hgb 12.5  Na 139  K 4.1  BUN 24  Creat 1.09    CBC RESULTS:   Recent Labs   Lab Test 06/28/17 05/11/17   WBC  8.5  7.6   RBC  4.07*  4.03*   HGB  12.3*  12.2*   HCT  38.3*  37.1*   MCV  94  92   MCH  30.2  30.3   MCHC  32.1  32.9   RDW  15.1*  14.4   PLT  302  266       Last Basic Metabolic Panel:  Recent Labs   Lab Test 06/28/17 05/11/17   NA  142  140   POTASSIUM  3.5  4.0   CHLORIDE  105  104   ELEANOR  9.4  9.1   CO2  28  28   BUN  17  20   CR  0.80  0.83   GLC  80  199*       Liver Function Studies -   Recent Labs   Lab Test 07/13/16 02/09/14   1955   PROTTOTAL  6.4  4.9*   ALBUMIN  3.2  2.3*   BILITOTAL  0.4  0.4   ALKPHOS  106  100   AST  18  30   ALT  20  46       TSH   Date Value Ref Range Status   12/09/2015 2.67 mcU/mL Final   12/26/2013 2.55 0.4 - 5.0 mU/L Final         Lab Results   Component Value Date    A1C 7.1 06/28/2017    A1C 8.7 12/14/2016       ASSESSMENT/PLAN  (E11.65) Type 2 diabetes mellitus with hyperglycemia, unspecified long term insulin use status (H)  (primary encounter diagnosis)  Comment: More tightly controlled than necessary. HgA1c <8% not recommended in elderly due to risk of hypoglycemia. Risk outweighs benefit of tighter control. His lower BG are consistently at dinner time, so will stop lunch dose of insulin  Plan: D/C Humalog at lunch, continue with 5 units at breakfast and 3 units at dinner. BGM BID prior to those meals. A1c in 3 months. Adjust medication as clinically indicated.    (F03.90) Dementia  without behavioral disturbance, unspecified dementia type  Comment: Weight loss has stabilized now, would not push for hospice at this time. Fall reduction is challenging as he is still able to get up on his own, but just does not have the strength to go far. Doubt falls are related to hypoglycemia or hypotension. Suspect it is just weakness. Needs 24 hour skilled nursing care. Expect further functional and cognitive decline. Expect weight loss.  Plan: Continue 24 hour care. Monitor weight. Monitor functional status. Monitor for behavioral disturbances.    (R60.0) Bilateral lower extremity edema  Comment: Chronic related to PVD, sitting in chair most of the day.  Plan: Continue current POC with no changes at this time and adjustments as needed.    (I10) Benign essential hypertension  Comment: Controlled, only on lasix  Plan: Continue current POC with no changes at this time and adjustments as needed.    Orders:  D/C lunch dose of Humalog  BGM BID      Electronically signed by:  DRE Nicholson [facility]  Pager: [phone]

## 2018-05-09 NOTE — LETTER
5/9/2018        RE: Titus Seay  [address] [place] [address]          [facility]    Chief Complaint   Patient presents with     [facility]       [place] Medical Record Number:  [id number]    HPI:    Titus Seay is a 89 year old  (10/27/1927), who is being seen today for a federally mandated E/M visit at [facility].      HPI information obtained from: facility chart records, facility staff and patient report.       Today's concerns are:  Type 2 diabetes mellitus with hyperglycemia, unspecified long term insulin use status (H)  On prandial insulin only. 3/28/18 A1c 7.1%  Last BG Levels:    AM: 150, 169, 154, 165, 171, 150, 161    Noon: 170, 157, 280, 153, 141, 205, 326    Dinner: 145, 240, 113, 112, 72, 140, 168  Lab Results   Component Value Date    A1C 7.1 06/28/2017    A1C 8.7 12/14/2016     Dementia without behavioral disturbance, unspecified dementia type  Current regimen Remeron 22.5m po qhs. Hospice consult was sent about a month ago due to functional and cognitive decline and weight loss, but then his daughter decided to hold off on that. Focus is still comfort care. No behavioral issues. Occasionally has a fall when attempting self transfer.    Bilateral lower extremity edema  Lasix increased 3/21/2018 due to worsening BLE edema.  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 He also has no past medical history of Thyroid disease.  PAST SURGICAL HISTORY:  has a past surgical history that includes drain skin abscess simple (7/2007); REMOVE EYE W IMPLANT (1995); colonoscopy; Excise mass head (11/15/2012); TOTAL KNEE ARTHROPLASTY (8/2010); and Fusion cervical posterior one level (10/14/2013).  FAMILY HISTORY: family history includes Alzheimer Disease in his father; Breast Cancer in his sister; C.A.D. in his brother; Cancer - colorectal in his mother; Circulatory in his brother; DIABETES in his brother, mother, and sister. There is no history of Anesthesia Reaction, OSTEOPOROSIS, or Thyroid Disease.  SOCIAL HISTORY:  reports that he quit smoking about 34 years ago. His smoking use included Pipe. He quit after 30.00 years of use. He has never used smokeless tobacco. He reports that he does not drink alcohol or use illicit drugs.    MEDICATIONS:  Current Outpatient Prescriptions   Medication Sig Dispense Refill     Acetaminophen (TYLENOL PO) Take 1,000 mg by mouth 3 times daily       furosemide (LASIX) 20 MG tablet Take 2 tablets (40 mg) by mouth 2 times daily       Insulin Lispro (HUMALOG SC) Inject 3 Units Subcutaneous 2 times daily With lunch and dinner. Give 5 units SC qday.       ipratropium - albuterol 0.5 mg/2.5 mg/3 mL (DUONEB) 0.5-2.5 (3) MG/3ML neb solution Take 1 vial by nebulization 2 times daily       ipratropium - albuterol 0.5 mg/2.5 mg/3 mL (DUONEB) 0.5-2.5 (3) MG/3ML nebulization Take 1 vial by nebulization 4 times daily as needed for shortness of breath / dyspnea or wheezing         METFORMIN HCL PO Take 1,000 mg by mouth 2 times daily (with meals)        Mirtazapine (REMERON PO) Take 22.5 mg by mouth At Bedtime        nystatin (MYCOSTATIN) cream Apply to left abdomen topically every morning and at bedtime for redness apply until healed and then change to everyday PRN       polyvinyl alcohol (LIQUIFILM TEARS) 1.4 % ophthalmic solution Place 1 drop into both eyes 2 times daily       potassium chloride SA (K-DUR/KLOR-CON M) 10 MEQ CR tablet Take 2 tablets (20 mEq) by mouth 2 times daily 30 tablet 1     Skin Protectants, Misc. (HYDROCERIN) LOTN Apply to right lower leg topically as needed for dry skin apply to right leg once a day       sodium chloride (OCEAN) 0.65 % nasal spray Spray 2 sprays into both nostrils 2 times daily       traMADol (ULTRAM) 25 MG TABS Take 25 mg by mouth daily Before BF scheduled.       White Petrolatum ointment as needed for dry skin Apply to Lower extrmities topically as needed for Wound qd prn       Medications reviewed:  Medications reconciled to facility chart and changes were made to reflect current medications as identified as above med list.   Case Management:  I have reviewed the care plan and MDS and do agree with the plan. Patient's desire to return to the community is not present.  Information reviewed:  Medications, vital signs, orders, and nursing notes.    ROS:  Reliability questionable secondary to cognitive impairment. Denies chest pain, shortness of breath, fevers, chills, headache, nausea, vomiting, dysuria or bowel abnormalities.  Appetite is good.  No pain.    Exam:  Vitals: /70  Pulse 58  Temp 97.1  F (36.2  C)  Resp 18  Wt 167 lb 6.4 oz (75.9 kg)  SpO2 95%  BMI 25.45 kg/m2  BMI= Body mass index is 25.45 kg/(m^2).   GENERAL APPEARANCE:  Drowsy, no apparent distress. Appears as though he has lost some weight  ENT:  Mouth and posterior oropharynx normal, moist mucous membranes, [place]  EYES:  EOM, conjunctivae, lids, pupils and irises  normal  NECK:  No adenopathy,masses or thyromegaly  RESP:  no respiratory distress, crackles bases  CV:  Palpation and auscultation of heart done , peripheral edema 3+ in LLE and 2+ RLE, rate-normal, grade 3/6 murmur  ABDOMEN:  normal bowel sounds, soft, nontender, no hepatosplenomegaly or other masses  SKIN:  Skin thin, several bruises  PSYCH:  oriented to self, insight and judgement impaired, memory impaired , affect abnormal flat      Lab/Diagnostic data:   3/28/18  A1c 7.1%  Hgb 12.5  Na 139  K 4.1  BUN 24  Creat 1.09    CBC RESULTS:   Recent Labs   Lab Test 06/28/17 05/11/17   WBC  8.5  7.6   RBC  4.07*  4.03*   HGB  12.3*  12.2*   HCT  38.3*  37.1*   MCV  94  92   MCH  30.2  30.3   MCHC  32.1  32.9   RDW  15.1*  14.4   PLT  302  266       Last Basic Metabolic Panel:  Recent Labs   Lab Test 06/28/17 05/11/17   NA  142  140   POTASSIUM  3.5  4.0   CHLORIDE  105  104   ELEANOR  9.4  9.1   CO2  28  28   BUN  17  20   CR  0.80  0.83   GLC  80  199*       Liver Function Studies -   Recent Labs   Lab Test 07/13/16 02/09/14   1955   PROTTOTAL  6.4  4.9*   ALBUMIN  3.2  2.3*   BILITOTAL  0.4  0.4   ALKPHOS  106  100   AST  18  30   ALT  20  46       TSH   Date Value Ref Range Status   12/09/2015 2.67 mcU/mL Final   12/26/2013 2.55 0.4 - 5.0 mU/L Final         Lab Results   Component Value Date    A1C 7.1 06/28/2017    A1C 8.7 12/14/2016       ASSESSMENT/PLAN  (E11.65) Type 2 diabetes mellitus with hyperglycemia, unspecified long term insulin use status (H)  (primary encounter diagnosis)  Comment: More tightly controlled than necessary. HgA1c <8% not recommended in elderly due to risk of hypoglycemia. Risk outweighs benefit of tighter control. His lower BG are consistently at dinner time, so will stop lunch dose of insulin  Plan: D/C Humalog at lunch, continue with 5 units at breakfast and 3 units at dinner. BGM BID prior to those meals. A1c in 3 months. Adjust medication as clinically indicated.    (F03.90) Dementia  without behavioral disturbance, unspecified dementia type  Comment: Weight loss has stabilized now, would not push for hospice at this time. Fall reduction is challenging as he is still able to get up on his own, but just does not have the strength to go far. Doubt falls are related to hypoglycemia or hypotension. Suspect it is just weakness. Needs 24 hour skilled nursing care. Expect further functional and cognitive decline. Expect weight loss.  Plan: Continue 24 hour care. Monitor weight. Monitor functional status. Monitor for behavioral disturbances.    (R60.0) Bilateral lower extremity edema  Comment: Chronic related to PVD, sitting in chair most of the day.  Plan: Continue current POC with no changes at this time and adjustments as needed.    (I10) Benign essential hypertension  Comment: Controlled, only on lasix  Plan: Continue current POC with no changes at this time and adjustments as needed.    Orders:  D/C lunch dose of Humalog  BGM BID      Electronically signed by:  DRE Nicholson [facility]  Pager: [phone]

## 2018-05-09 NOTE — PROGRESS NOTES
[facility]    Chief Complaint   Patient presents with     [facility]       [place] Medical Record Number:  [id number]    HPI:    Titus Seay is a 89 year old  (10/27/1927), who is being seen today for a federally mandated E/M visit at [facility].      HPI information obtained from: facility chart records, facility staff and patient report.       Today's concerns are:  Type 2 diabetes mellitus with hyperglycemia, unspecified long term insulin use status (H)  On prandial insulin only. 3/28/18 A1c 7.1%  Last BG Levels:    AM: 150, 169, 154, 165, 171, 150, 161    Noon: 170, 157, 280, 153, 141, 205, 326    Dinner: 145, 240, 113, 112, 72, 140, 168  Lab Results   Component Value Date    A1C 7.1 06/28/2017    A1C 8.7 12/14/2016     Dementia without behavioral disturbance, unspecified dementia type  Current regimen Remeron 22.5m po qhs. Hospice consult was sent about a month ago due to functional and cognitive decline and weight loss, but then his daughter decided to hold off on that. Focus is still comfort care. No behavioral issues. Occasionally has a fall when attempting self transfer.    Bilateral lower extremity edema  Lasix increased 3/21/2018 due to worsening BLE edema.  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 He also has no past medical history of Thyroid disease.  PAST SURGICAL HISTORY:  has a past surgical history that includes drain skin abscess simple (7/2007); REMOVE EYE W IMPLANT (1995); colonoscopy; Excise mass head (11/15/2012); TOTAL KNEE ARTHROPLASTY (8/2010); and Fusion cervical posterior one level (10/14/2013).  FAMILY HISTORY: family history includes Alzheimer Disease in his father; Breast Cancer in his sister; C.A.D. in his brother; Cancer - colorectal in his mother; Circulatory in his brother; DIABETES in his brother, mother, and sister. There is no history of Anesthesia Reaction, OSTEOPOROSIS, or Thyroid Disease.  SOCIAL HISTORY:  reports that he quit smoking about 34 years ago. His smoking use included Pipe. He quit after 30.00 years of use. He has never used smokeless tobacco. He reports that he does not drink alcohol or use illicit drugs.    MEDICATIONS:  Current Outpatient Prescriptions   Medication Sig Dispense Refill     Acetaminophen (TYLENOL PO) Take 1,000 mg by mouth 3 times daily       furosemide (LASIX) 20 MG tablet Take 2 tablets (40 mg) by mouth 2 times daily       Insulin Lispro (HUMALOG SC) Inject 3 Units Subcutaneous 2 times daily With lunch and dinner. Give 5 units SC qday.       ipratropium - albuterol 0.5 mg/2.5 mg/3 mL (DUONEB) 0.5-2.5 (3) MG/3ML neb solution Take 1 vial by nebulization 2 times daily       ipratropium - albuterol 0.5 mg/2.5 mg/3 mL (DUONEB) 0.5-2.5 (3) MG/3ML nebulization Take 1 vial by nebulization 4 times daily as needed for shortness of breath / dyspnea or wheezing        METFORMIN HCL PO Take 1,000 mg by mouth 2 times daily (with meals)        Mirtazapine (REMERON PO)  Take 22.5 mg by mouth At Bedtime        nystatin (MYCOSTATIN) cream Apply to left abdomen topically every morning and at bedtime for redness apply until healed and then change to everyday PRN       polyvinyl alcohol (LIQUIFILM TEARS) 1.4 % ophthalmic solution Place 1 drop into both eyes 2 times daily       potassium chloride SA (K-DUR/KLOR-CON M) 10 MEQ CR tablet Take 2 tablets (20 mEq) by mouth 2 times daily 30 tablet 1     Skin Protectants, Misc. (HYDROCERIN) LOTN Apply to right lower leg topically as needed for dry skin apply to right leg once a day       sodium chloride (OCEAN) 0.65 % nasal spray Spray 2 sprays into both nostrils 2 times daily       traMADol (ULTRAM) 25 MG TABS Take 25 mg by mouth daily Before BF scheduled.       White Petrolatum ointment as needed for dry skin Apply to Lower extrmities topically as needed for Wound qd prn       Medications reviewed:  Medications reconciled to facility chart and changes were made to reflect current medications as identified as above med list.   Case Management:  I have reviewed the care plan and MDS and do agree with the plan. Patient's desire to return to the community is not present.  Information reviewed:  Medications, vital signs, orders, and nursing notes.    ROS:  Reliability questionable secondary to cognitive impairment. Denies chest pain, shortness of breath, fevers, chills, headache, nausea, vomiting, dysuria or bowel abnormalities.  Appetite is good.  No pain.    Exam:  Vitals: /70  Pulse 58  Temp 97.1  F (36.2  C)  Resp 18  Wt 167 lb 6.4 oz (75.9 kg)  SpO2 95%  BMI 25.45 kg/m2  BMI= Body mass index is 25.45 kg/(m^2).   GENERAL APPEARANCE:  Drowsy, no apparent distress. Appears as though he has lost some weight  ENT:  Mouth and posterior oropharynx normal, moist mucous membranes, [place]  EYES:  EOM, conjunctivae, lids, pupils and irises normal  NECK:  No adenopathy,masses or thyromegaly  RESP:  no respiratory distress, crackles bases  CV:   Palpation and auscultation of heart done , peripheral edema 3+ in LLE and 2+ RLE, rate-normal, grade 3/6 murmur  ABDOMEN:  normal bowel sounds, soft, nontender, no hepatosplenomegaly or other masses  SKIN:  Skin thin, several bruises  PSYCH:  oriented to self, insight and judgement impaired, memory impaired , affect abnormal flat      Lab/Diagnostic data:   3/28/18  A1c 7.1%  Hgb 12.5  Na 139  K 4.1  BUN 24  Creat 1.09    CBC RESULTS:   Recent Labs   Lab Test 06/28/17 05/11/17   WBC  8.5  7.6   RBC  4.07*  4.03*   HGB  12.3*  12.2*   HCT  38.3*  37.1*   MCV  94  92   MCH  30.2  30.3   MCHC  32.1  32.9   RDW  15.1*  14.4   PLT  302  266       Last Basic Metabolic Panel:  Recent Labs   Lab Test 06/28/17 05/11/17   NA  142  140   POTASSIUM  3.5  4.0   CHLORIDE  105  104   ELEANOR  9.4  9.1   CO2  28  28   BUN  17  20   CR  0.80  0.83   GLC  80  199*       Liver Function Studies -   Recent Labs   Lab Test 07/13/16 02/09/14   1955   PROTTOTAL  6.4  4.9*   ALBUMIN  3.2  2.3*   BILITOTAL  0.4  0.4   ALKPHOS  106  100   AST  18  30   ALT  20  46       TSH   Date Value Ref Range Status   12/09/2015 2.67 mcU/mL Final   12/26/2013 2.55 0.4 - 5.0 mU/L Final         Lab Results   Component Value Date    A1C 7.1 06/28/2017    A1C 8.7 12/14/2016       ASSESSMENT/PLAN  (E11.65) Type 2 diabetes mellitus with hyperglycemia, unspecified long term insulin use status (H)  (primary encounter diagnosis)  Comment: More tightly controlled than necessary. HgA1c <8% not recommended in elderly due to risk of hypoglycemia. Risk outweighs benefit of tighter control. His lower BG are consistently at dinner time, so will stop lunch dose of insulin  Plan: D/C Humalog at lunch, continue with 5 units at breakfast and 3 units at dinner. BGM BID prior to those meals. A1c in 3 months. Adjust medication as clinically indicated.    (F03.90) Dementia without behavioral disturbance, unspecified dementia type  Comment: Weight loss has stabilized now, would not  push for hospice at this time. Fall reduction is challenging as he is still able to get up on his own, but just does not have the strength to go far. Doubt falls are related to hypoglycemia or hypotension. Suspect it is just weakness. Needs 24 hour skilled nursing care. Expect further functional and cognitive decline. Expect weight loss.  Plan: Continue 24 hour care. Monitor weight. Monitor functional status. Monitor for behavioral disturbances.    (R60.0) Bilateral lower extremity edema  Comment: Chronic related to PVD, sitting in chair most of the day.  Plan: Continue current POC with no changes at this time and adjustments as needed.    (I10) Benign essential hypertension  Comment: Controlled, only on lasix  Plan: Continue current POC with no changes at this time and adjustments as needed.    Orders:  D/C lunch dose of Humalog  BGM BID      Electronically signed by:  DRE Nicholson [facility]  Pager: [phone]

## 2018-05-30 NOTE — PROGRESS NOTES
[facility]    Chief Complaint   Patient presents with     [facility]       [place] Medical Record Number:  [id number]    HPI:    Titus Seay is a 89 year old  (10/27/1927), who is being seen today for an episodic care visit at [facility].      HPI information obtained from: facility chart records, facility staff and patient report.      Today's concern is:  Type 2 diabetes mellitus with hyperglycemia, unspecified long term insulin use status (H)  Lunch dose of aspart insulin stopped due to some hypoglycemia, A1c below goal.     A1C on 3/28/18 ; 7.1  A1C on 9/20/17 ; 8.0  Last BG Levels:    AM: 146, 175, 145, 140, 151, 142, 143, 147, 166    Dinner: 200, 183, 241, 187, 156, 176, 219, 166, 167     Lab Results   Component Value Date    A1C 7.1 06/28/2017    A1C 8.7 12/14/2016       Hypotension, unspecified hypotension type  Only on lasix, no other antihypertensive medications. Patient notes feeling very tired all the time  BP readings range:  109/72  111/71  123/70  105/69  103/64  115/66  125/61  120/70  122/69  106/63  129/76  97/60  102/72    Bilateral lower extremity edema  In 3/2018 diuretics were doubled due to significant edema and open wounds. Edema is improved.   Wt Readings from Last 4 Encounters:   05/30/18 162 lb (73.5 kg)   05/09/18 167 lb 6.4 oz (75.9 kg)   03/21/18 165 lb 6.4 oz (75 kg)   03/07/18 168 lb (76.2 kg)         ALLERGIES: Sulfa drugs and Bactrim [sulfamethoxazole w/trimethoprim]  Past Medical, Surgical, Family and Social History reviewed and updated in [employer].    Current Outpatient Prescriptions   Medication Sig Dispense Refill     Acetaminophen (TYLENOL PO) Take 1,000 mg by mouth 3 times daily       furosemide (LASIX) 20 MG tablet Take 2 tablets (40 mg) by mouth 2 times daily       insulin lispro (HUMALOG) 100 UNIT/ML injection 5 units with breakfast, 3 units with dinner. No insulin with lunch       ipratropium - albuterol 0.5 mg/2.5 mg/3 mL (DUONEB) 0.5-2.5 (3)  MG/3ML neb solution Take 1 vial by nebulization 2 times daily       ipratropium - albuterol 0.5 mg/2.5 mg/3 mL (DUONEB) 0.5-2.5 (3) MG/3ML nebulization Take 1 vial by nebulization 4 times daily as needed for shortness of breath / dyspnea or wheezing        METFORMIN HCL PO Take 1,000 mg by mouth 2 times daily (with meals)        Mirtazapine (REMERON PO) Take 22.5 mg by mouth At Bedtime        nystatin (MYCOSTATIN) cream Apply to left abdomen topically every morning and at bedtime for redness apply until healed and then change to everyday PRN       polyvinyl alcohol (LIQUIFILM TEARS) 1.4 % ophthalmic solution Place 1 drop into both eyes 2 times daily       potassium chloride SA (K-DUR/KLOR-CON M) 10 MEQ CR tablet Take 2 tablets (20 mEq) by mouth 2 times daily 30 tablet 1     Skin Protectants, Misc. (HYDROCERIN) LOTN Apply to right lower leg topically as needed for dry skin apply to right leg once a day       sodium chloride (OCEAN) 0.65 % nasal spray Spray 2 sprays into both nostrils 2 times daily       White Petrolatum ointment as needed for dry skin Apply to Lower extrmities topically as needed for Wound qd prn       Medications reconciled to facility chart and changes were made to reflect current medications as identified as above med list.     REVIEW OF SYSTEMS:  Reliability questionable secondary to cognitive impairment. Denies chest pain, shortness of breath, fevers, chills, headache, nausea, vomiting, dysuria or bowel abnormalities. Appetite is ok.  No pain     Physical Exam:  /72  Pulse 58  Temp 98.3  F (36.8  C)  Resp 16  Wt 162 lb (73.5 kg)  SpO2 100%  BMI 24.63 kg/m2   GENERAL APPEARANCE:  Drowsy, no apparent distress. Appears as though he has lost some weight  ENT:  Mouth and posterior oropharynx normal, moist mucous membranes, [place]  EYES:  EOM, conjunctivae, lids, pupils and irises normal  NECK:  No adenopathy,masses or thyromegaly  RESP:  no respiratory distress, crackles bases  CV:   Palpation and auscultation of heart done, rate-normal, grade 3/6 murmur,  peripheral edema 1+ in L ankle and trace R ankle  ABDOMEN:  normal bowel sounds, soft, nontender, no hepatosplenomegaly or other masses  SKIN:  Skin thin, several bruises. Small open areas on LLE  PSYCH:  oriented to self, insight and judgement impaired, memory impaired , affect abnormal flat      Recent Labs:     CBC RESULTS:   Recent Labs   Lab Test 06/28/17 05/11/17   WBC  8.5  7.6   RBC  4.07*  4.03*   HGB  12.3*  12.2*   HCT  38.3*  37.1*   MCV  94  92   MCH  30.2  30.3   MCHC  32.1  32.9   RDW  15.1*  14.4   PLT  302  266       Last Basic Metabolic Panel:  Recent Labs   Lab Test 06/28/17 05/11/17   NA  142  140   POTASSIUM  3.5  4.0   CHLORIDE  105  104   ELEANOR  9.4  9.1   CO2  28  28   BUN  17  20   CR  0.80  0.83   GLC  80  199*       Liver Function Studies -   Recent Labs   Lab Test 07/13/16 02/09/14   1955   PROTTOTAL  6.4  4.9*   ALBUMIN  3.2  2.3*   BILITOTAL  0.4  0.4   ALKPHOS  106  100   AST  18  30   ALT  20  46       TSH   Date Value Ref Range Status   12/09/2015 2.67 mcU/mL Final   12/26/2013 2.55 0.4 - 5.0 mU/L Final       Lab Results   Component Value Date    A1C 7.1 06/28/2017    A1C 8.7 12/14/2016         Assessment/Plan:  (E11.65) Type 2 diabetes mellitus with hyperglycemia, unspecified long term insulin use status (H)  (primary encounter diagnosis)  Comment: Adequately controlled. HgA1c <8% not recommended in elderly due to risk of hypoglycemia. Risk outweighs benefit of tighter control. Goals of care are comfort based.  Plan: Continue current POC with no changes at this time. Check A1c next month    (I95.9) Hypotension, unspecified hypotension type  Comment: May be related to lasix, decreased intake  Plan: See edema    (R60.0) Bilateral lower extremity edema  Comment: Improved. Will try reducing lasix given his weight loss, fatigue and hypotension. Historically he gets wounds easily and they heal better if edema is  controlled  Plan: Decrease lasix to 20mg BID. Monitor for weight gain, edema, open areas.      Orders:  Decrease lasix to 20mg BID  Decrease KCl to 10 meq BID  Call NP if weight >168 lbs  BMP, A1c in June      Electronically signed by  DRE Nicholson [facility]  Pager: [phone]

## 2018-05-30 NOTE — LETTER
5/30/2018        RE: Titus Seay  [address] [place] [address]        [facility]    Chief Complaint   Patient presents with     CELE       [facility] Record Number:  [id number]    HPI:    Titus Seay is a 89 year old  (10/27/1927), who is being seen today for an episodic care visit at [facility].      HPI information obtained from: facility chart records, facility staff and patient report.      Today's concern is:  Type 2 diabetes mellitus with hyperglycemia, unspecified long term insulin use status (H)  Lunch dose of aspart insulin stopped due to some hypoglycemia, A1c below goal.     A1C on 3/28/18 ; 7.1  A1C on 9/20/17 ; 8.0  Last BG Levels:    AM: 146, 175, 145, 140, 151, 142, 143, 147, 166    Dinner: 200, 183, 241, 187, 156, 176, 219, 166, 167     Lab Results   Component Value Date    A1C 7.1 06/28/2017    A1C 8.7 12/14/2016       Hypotension, unspecified hypotension type  Only on lasix, no other antihypertensive medications. Patient notes feeling very tired all the time  BP readings range:  109/72  111/71  123/70  105/69  103/64  115/66  125/61  120/70  122/69  106/63  129/76  97/60  102/72    Bilateral lower extremity edema  In 3/2018 diuretics were doubled due to significant edema and open wounds. Edema is improved.   Wt Readings from Last 4 Encounters:   05/30/18 162 lb (73.5 kg)   05/09/18 167 lb 6.4 oz (75.9 kg)   03/21/18 165 lb 6.4 oz (75 kg)   03/07/18 168 lb (76.2 kg)         ALLERGIES: Sulfa drugs and Bactrim [sulfamethoxazole w/trimethoprim]  Past Medical, Surgical, Family and Social History reviewed and updated in EPIC.    Current Outpatient Prescriptions   Medication Sig Dispense Refill     Acetaminophen (TYLENOL PO) Take 1,000 mg by mouth 3 times daily       furosemide (LASIX) 20 MG tablet Take 2 tablets (40 mg) by mouth 2 times daily       insulin lispro (HUMALOG) 100 UNIT/ML injection 5 units with breakfast, 3 units with dinner.  No insulin with lunch       ipratropium - albuterol 0.5 mg/2.5 mg/3 mL (DUONEB) 0.5-2.5 (3) MG/3ML neb solution Take 1 vial by nebulization 2 times daily       ipratropium - albuterol 0.5 mg/2.5 mg/3 mL (DUONEB) 0.5-2.5 (3) MG/3ML nebulization Take 1 vial by nebulization 4 times daily as needed for shortness of breath / dyspnea or wheezing        METFORMIN HCL PO Take 1,000 mg by mouth 2 times daily (with meals)        Mirtazapine (REMERON PO) Take 22.5 mg by mouth At Bedtime        nystatin (MYCOSTATIN) cream Apply to left abdomen topically every morning and at bedtime for redness apply until healed and then change to everyday PRN       polyvinyl alcohol (LIQUIFILM TEARS) 1.4 % ophthalmic solution Place 1 drop into both eyes 2 times daily       potassium chloride SA (K-DUR/KLOR-CON M) 10 MEQ CR tablet Take 2 tablets (20 mEq) by mouth 2 times daily 30 tablet 1     Skin Protectants, Misc. (HYDROCERIN) LOTN Apply to right lower leg topically as needed for dry skin apply to right leg once a day       sodium chloride (OCEAN) 0.65 % nasal spray Spray 2 sprays into both nostrils 2 times daily       White Petrolatum ointment as needed for dry skin Apply to Lower extrmities topically as needed for Wound qd prn       Medications reconciled to facility chart and changes were made to reflect current medications as identified as above med list.     REVIEW OF SYSTEMS:  Reliability questionable secondary to cognitive impairment. Denies chest pain, shortness of breath, fevers, chills, headache, nausea, vomiting, dysuria or bowel abnormalities. Appetite is ok.  No pain     Physical Exam:  /72  Pulse 58  Temp 98.3  F (36.8  C)  Resp 16  Wt 162 lb (73.5 kg)  SpO2 100%  BMI 24.63 kg/m2   GENERAL APPEARANCE:  Drowsy, no apparent distress. Appears as though he has lost some weight  ENT:  Mouth and posterior oropharynx normal, moist mucous membranes, [place]  EYES:  EOM, conjunctivae, lids, pupils and irises normal  NECK:  No  adenopathy,masses or thyromegaly  RESP:  no respiratory distress, crackles bases  CV:  Palpation and auscultation of heart done, rate-normal, grade 3/6 murmur,  peripheral edema 1+ in L ankle and trace R ankle  ABDOMEN:  normal bowel sounds, soft, nontender, no hepatosplenomegaly or other masses  SKIN:  Skin thin, several bruises. Small open areas on LLE  PSYCH:  oriented to self, insight and judgement impaired, memory impaired , affect abnormal flat      Recent Labs:     CBC RESULTS:   Recent Labs   Lab Test 06/28/17 05/11/17   WBC  8.5  7.6   RBC  4.07*  4.03*   HGB  12.3*  12.2*   HCT  38.3*  37.1*   MCV  94  92   MCH  30.2  30.3   MCHC  32.1  32.9   RDW  15.1*  14.4   PLT  302  266       Last Basic Metabolic Panel:  Recent Labs   Lab Test 06/28/17 05/11/17   NA  142  140   POTASSIUM  3.5  4.0   CHLORIDE  105  104   ELEANOR  9.4  9.1   CO2  28  28   BUN  17  20   CR  0.80  0.83   GLC  80  199*       Liver Function Studies -   Recent Labs   Lab Test 07/13/16 02/09/14   1955   PROTTOTAL  6.4  4.9*   ALBUMIN  3.2  2.3*   BILITOTAL  0.4  0.4   ALKPHOS  106  100   AST  18  30   ALT  20  46       TSH   Date Value Ref Range Status   12/09/2015 2.67 mcU/mL Final   12/26/2013 2.55 0.4 - 5.0 mU/L Final       Lab Results   Component Value Date    A1C 7.1 06/28/2017    A1C 8.7 12/14/2016         Assessment/Plan:  (E11.65) Type 2 diabetes mellitus with hyperglycemia, unspecified long term insulin use status (H)  (primary encounter diagnosis)  Comment: Adequately controlled. HgA1c <8% not recommended in elderly due to risk of hypoglycemia. Risk outweighs benefit of tighter control. Goals of care are comfort based.  Plan: Continue current POC with no changes at this time. Check A1c next month    (I95.9) Hypotension, unspecified hypotension type  Comment: May be related to lasix, decreased intake  Plan: See edema    (R60.0) Bilateral lower extremity edema  Comment: Improved. Will try reducing lasix given his weight loss, fatigue  and hypotension. Historically he gets wounds easily and they heal better if edema is controlled  Plan: Decrease lasix to 20mg BID. Monitor for weight gain, edema, open areas.      Orders:  Decrease lasix to 20mg BID  Decrease KCl to 10 meq BID  Call NP if weight >168 lbs  BMP, A1c in June      Electronically signed by  DRE [facility]  Pager: [phone]

## 2018-07-19 NOTE — PROGRESS NOTES
[facility]  Nursing Home Regulatory Visit  July 19, 2018    Chief Complaint   Patient presents with     [facility] Regulatory       HPI:    Titus Seay is a 89 year old  (10/27/1927), who is being seen today for a federally mandated E/M visit at [facility]. Today's concerns are:    1) HTN -- By history. SBPs 110s-120s. He is not on any anti-HTN medications.   2) DM, Type II -- Hgb A1c 7.1 in March. Sugars 130s-190s overall on lispro 5 units with breakfast and 3 units with dinner (none at lunch) as well as metformin 1000 mg BID.   3) Dementia Without Behavioral Disturbance -- Oriented to self. Chronic and progressive. Comfort focus of cares.     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 2012      URI (upper respiratory infection)     sore throat, cough, hoarse - started last night - worse today      Wenckebach's incomplete AV block        PAST SURGICAL HISTORY:   Past Surgical History:   Procedure Laterality Date     C REMOVE EYE W IMPLANT  1995    right eye     C TOTAL KNEE ARTHROPLASTY  8/2010     COLONOSCOPY       DRAIN SKIN ABSCESS SIMPLE  7/2007    right foot     EXCISE MASS HEAD  11/15/2012     Procedure: EXCISE MASS HEAD;  EXCISION OF SCALP MASSES TIMES TWO;  Surgeon: Stevan Gonsales MD;  Location: [facility] OR     FUSION CERVICAL POSTERIOR ONE LEVEL  10/14/2013    Procedure: FUSION CERVICAL POSTERIOR ONE LEVEL;  Cervical 1 - 2 Posterior Cervical Fusions with neuro monitoring, and Dorsal Slit of Foreskin;  Surgeon: Miah Hutson MD;  Location: [facility] OR       FAMILY HISTORY:   Family History   Problem Relation Age of Onset     Alzheimer Disease Father        at age 79 of Pneumonia     Cancer - colorectal Mother       at age 79     Breast Cancer Sister       at age 44      Diabetes Mother      Diabetes Brother       at age 70      Circulatory Brother       of an anurysm at age 70     Diabetes Sister      C.A.D. Brother      He  suddenly     Anesthesia Reaction No family hx of      Osteoperosis No family hx of      Thyroid Disease No family hx of        SOCIAL HISTORY:   Lives in a SNF    MEDICATIONS:  Current Outpatient Prescriptions   Medication Sig Dispense Refill     Acetaminophen (TYLENOL PO) Take 1,000 mg by mouth 3 times daily       furosemide (LASIX) 20 MG tablet Take 1 tablet (20 mg) by mouth 2 times daily 30 tablet      insulin lispro (HUMALOG) 100 UNIT/ML injection 5 units with breakfast, 3 units with dinner. No insulin with lunch       ipratropium - albuterol 0.5 mg/2.5 mg/3 mL (DUONEB) 0.5-2.5 (3) MG/3ML neb solution Take 1 vial by nebulization 2 times daily       ipratropium - albuterol 0.5 mg/2.5 mg/3 mL (DUONEB) 0.5-2.5 (3) MG/3ML nebulization Take 1 vial by nebulization 4 times daily as needed for shortness of breath / dyspnea or wheezing        METFORMIN HCL PO Take 1,000 mg by mouth 2 times daily (with meals)        Mirtazapine (REMERON PO) Take 22.5 mg by mouth At Bedtime        nystatin (MYCOSTATIN) cream Apply to left abdomen topically every morning and at bedtime for redness apply until healed and then change to everyday PRN       polyvinyl alcohol (LIQUIFILM  TEARS) 1.4 % ophthalmic solution Place 1 drop into both eyes 2 times daily       potassium chloride SA (K-DUR/KLOR-CON M) 10 MEQ CR tablet Take 1 tablet (10 mEq) by mouth 2 times daily 30 tablet 1     Skin Protectants, Misc. (HYDROCERIN) LOTN Apply to right lower leg topically as needed for dry skin apply to right leg once a day       sodium chloride (OCEAN) 0.65 % nasal spray Spray 2 sprays into both nostrils 2 times daily       White Petrolatum ointment as needed for dry skin Apply to Lower extrmities topically as needed for Wound qd prn         Medications reviewed:  Medications reconciled to facility chart and changes were made to reflect current medications as identified as above med list. Below are the changes that were made:   Medications stopped since last EPIC medication reconciliation:   There are no discontinued medications.  Medications started since last [facility] medication reconciliation:  No orders of the defined types were placed in this encounter.    Case Management:  I have reviewed the care plan and MDS and do agree with the plan.   Information reviewed:  Medications, vital signs, orders, and nursing notes.    ROS:  Unable to be obtained due to cognitive impairment or aphasia.     PHYSICAL EXAM:  /71  Pulse 96  Temp 97.7  F (36.5  C)  Resp 18  Wt 164 lb 8 oz (74.6 kg)  SpO2 97%  BMI 25.01 kg/m2  Gen: sitting in wheelchair, alert, cooperative and in no acute distress  Card: RRR, S1, S2, no murmurs  Resp: lungs clear to auscultation bilaterally, no crackles or wheezes  GI: abdomen soft, not-tender  Ext: bilateral dependent LE edema  Neuro: CX II-XII grossly in tact; ROM in all four extremities grossly in tact  Psych: memory, judgement and insight impaired    Lab/Diagnostic data:  Reviewed as per Epic    ASSESSMENT/PLAN    1) HTN   By history. SBPs 110s-120s. He is not on any anti-HTN medications.   -- follow clinically    2) DM, Type II -- Hgb A1c 7.1 in March. Sugars 130s-190s overall   --  continues on lispro 5 units with breakfast and 3 units with dinner (none at lunch) as well as metformin 1000 mg BID  -- follow sugars and adjust regimen as needed    3) Dementia Without Behavioral Disturbance   Oriented to self. Chronic and progressive. Comfort focus of cares.   -- continues on mirtazapine 22.5 mg qhs  -- ongoing 24/7 nursing and supportive cares  Electronically signed by:  Miya Crisostomo MD

## 2018-07-19 NOTE — LETTER
7/19/2018        RE: Titus Seay  [address] [place] [address]        [facility]  Nursing Home Regulatory Visit  July 19, 2018    Chief Complaint   Patient presents with     [facility]ail Regulatory       HPI:    Titus Seay is a 89 year old  (10/27/1927), who is being seen today for a federally mandated E/M visit at [facility]. Today's concerns are:    1) HTN -- By history. SBPs 110s-120s. He is not on any anti-HTN medications.   2) DM, Type II -- Hgb A1c 7.1 in March. Sugars 130s-190s overall on lispro 5 units with breakfast and 3 units with dinner (none at lunch) as well as metformin 1000 mg BID.   3) Dementia Without Behavioral Disturbance -- Oriented to self. Chronic and progressive. Comfort focus of cares.     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 2012      URI (upper respiratory infection)     sore throat, cough, hoarse - started last night - worse today      Wenckebach's incomplete AV block        PAST SURGICAL HISTORY:   Past Surgical History:   Procedure Laterality Date     C REMOVE EYE W IMPLANT  1995    right eye     C TOTAL KNEE ARTHROPLASTY  8/2010     COLONOSCOPY        DRAIN SKIN ABSCESS SIMPLE  2007    right foot     EXCISE MASS HEAD  11/15/2012    Procedure: EXCISE MASS HEAD;  EXCISION OF SCALP MASSES TIMES TWO;  Surgeon: Stevan Gonsales MD;  Location: [facility] OR     FUSION CERVICAL POSTERIOR ONE LEVEL  10/14/2013    Procedure: FUSION CERVICAL POSTERIOR ONE LEVEL;  Cervical 1 - 2 Posterior Cervical Fusions with neuro monitoring, and Dorsal Slit of Foreskin;  Surgeon: Miah Hutson MD;  Location: [facility] OR       FAMILY HISTORY:   Family History   Problem Relation Age of Onset     Alzheimer Disease Father        at age 79 of Pneumonia     Cancer - colorectal Mother       at age 79     Breast Cancer Sister       at age 44      Diabetes Mother      Diabetes Brother       at age 70      Circulatory Brother       of an anurysm at age 70     Diabetes Sister      C.A.D. Brother      He  suddenly     Anesthesia Reaction No family hx of      Osteoperosis No family hx of      Thyroid Disease No family hx of        SOCIAL HISTORY:   Lives in a SNF    MEDICATIONS:  Current Outpatient Prescriptions   Medication Sig Dispense Refill     Acetaminophen (TYLENOL PO) Take 1,000 mg by mouth 3 times daily       furosemide (LASIX) 20 MG tablet Take 1 tablet (20 mg) by mouth 2 times daily 30 tablet      insulin lispro (HUMALOG) 100 UNIT/ML injection 5 units with breakfast, 3 units with dinner. No insulin with lunch       ipratropium - albuterol 0.5 mg/2.5 mg/3 mL (DUONEB) 0.5-2.5 (3) MG/3ML neb solution Take 1 vial by nebulization 2 times daily       ipratropium - albuterol 0.5 mg/2.5 mg/3 mL (DUONEB) 0.5-2.5 (3) MG/3ML nebulization Take 1 vial by nebulization 4 times daily as needed for shortness of breath / dyspnea or wheezing        METFORMIN HCL PO Take 1,000 mg by mouth 2 times daily (with meals)        Mirtazapine (REMERON PO) Take 22.5 mg by mouth At Bedtime        nystatin (MYCOSTATIN) cream Apply to left abdomen topically every morning and at bedtime for redness  apply until healed and then change to everyday PRN       polyvinyl alcohol (LIQUIFILM TEARS) 1.4 % ophthalmic solution Place 1 drop into both eyes 2 times daily       potassium chloride SA (K-DUR/KLOR-CON M) 10 MEQ CR tablet Take 1 tablet (10 mEq) by mouth 2 times daily 30 tablet 1     Skin Protectants, Misc. (HYDROCERIN) LOTN Apply to right lower leg topically as needed for dry skin apply to right leg once a day       sodium chloride (OCEAN) 0.65 % nasal spray Spray 2 sprays into both nostrils 2 times daily       White Petrolatum ointment as needed for dry skin Apply to Lower extrmities topically as needed for Wound qd prn         Medications reviewed:  Medications reconciled to facility chart and changes were made to reflect current medications as identified as above med list. Below are the changes that were made:   Medications stopped since last EPIC medication reconciliation:   There are no discontinued medications.  Medications started since last [facility] medication reconciliation:  No orders of the defined types were placed in this encounter.    Case Management:  I have reviewed the care plan and MDS and do agree with the plan.   Information reviewed:  Medications, vital signs, orders, and nursing notes.    ROS:  Unable to be obtained due to cognitive impairment or aphasia.     PHYSICAL EXAM:  /71  Pulse 96  Temp 97.7  F (36.5  C)  Resp 18  Wt 164 lb 8 oz (74.6 kg)  SpO2 97%  BMI 25.01 kg/m2  Gen: sitting in wheelchair, alert, cooperative and in no acute distress  Card: RRR, S1, S2, no murmurs  Resp: lungs clear to auscultation bilaterally, no crackles or wheezes  GI: abdomen soft, not-tender  Ext: bilateral dependent LE edema  Neuro: CX II-XII grossly in tact; ROM in all four extremities grossly in tact  Psych: memory, judgement and insight impaired    Lab/Diagnostic data:  Reviewed as per Epic    ASSESSMENT/PLAN    1) HTN   By history. SBPs 110s-120s. He is not on any anti-HTN medications.   --  follow clinically    2) DM, Type II -- Hgb A1c 7.1 in March. Sugars 130s-190s overall   -- continues on lispro 5 units with breakfast and 3 units with dinner (none at lunch) as well as metformin 1000 mg BID  -- follow sugars and adjust regimen as needed    3) Dementia Without Behavioral Disturbance   Oriented to self. Chronic and progressive. Comfort focus of cares.   -- continues on mirtazapine 22.5 mg qhs  -- ongoing 24/7 nursing and supportive cares  Electronically signed by:  Miya Crisostomo MD

## 2018-09-19 NOTE — LETTER
9/19/2018        RE: Titus Seay  [address]  [place] [address]          [facility]    Chief Complaint   Patient presents with     [facility]       [place] Medical Record Number:  [id number]    HPI:    Titus Seay is a 89 year old  (10/27/1927), who is being seen today for a federally mandated E/M visit at [facility] .      HPI information obtained from: facility chart records, facility staff and patient report.     Today's concerns are:  Type 2 diabetes mellitus with hyperglycemia, unspecified long term insulin use status (H)  A1C 7.0 on 6/13/18  Last BG Levels:    AM: 152, 142, 181, 164, 183, 178, 154, 180, 163, 204, 179, 189    Dinner: 230, 188, 235, 216, 242, 152, 216, 235, 191, 160, 191, 221     Lab Results   Component Value Date    A1C 7.1 03/28/2018    A1C 8.0 09/20/2017       Dementia without behavioral disturbance, unspecified dementia type  Taking Remeron 22.5mg po at bedtime. Had some functional and cognitive decline in the past year, but this has stabilized now. Focus is comfort care. No behavioral issues. Occasionally has a fall when attempting self transfer.    Benign essential hypertension  BP readings range:  104/61   109/70   125/72  141/80  140/75  112/58  128/72  116/72  118/77  134/87  129/74  101/59     Bilateral lower extremity edema  Current regimen Furosemide 20mg po BID. No SOB, hypoxia. He just got a new skin tear on his leg this morning.   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 He also has no past medical history of Thyroid disease.  PAST SURGICAL HISTORY:  has a past surgical history that includes drain skin abscess simple (7/2007); REMOVE EYE W IMPLANT (1995); colonoscopy; Excise mass head (11/15/2012); TOTAL KNEE ARTHROPLASTY (8/2010); and Fusion cervical posterior one level (10/14/2013).  FAMILY HISTORY: family history includes Alzheimer Disease in his father; Breast Cancer in his sister; C.A.D. in his brother; Cancer - colorectal in his mother; Circulatory in his brother; Diabetes in his brother, mother, and sister. There is no history of Anesthesia Reaction, Osteoporosis, or Thyroid Disease.  SOCIAL HISTORY:  reports that he quit smoking about 34 years ago. His smoking use included Pipe. He quit after 30.00 years of use. He has never used smokeless tobacco. He reports that he does not drink alcohol or use illicit drugs.    MEDICATIONS:  Current Outpatient Prescriptions   Medication Sig Dispense Refill     Acetaminophen (TYLENOL PO) Take 1,000 mg by mouth 3 times daily       furosemide (LASIX) 20 MG tablet Take 1 tablet (20 mg) by mouth 2 times daily 30 tablet      insulin lispro (HUMALOG) 100 UNIT/ML injection 5 units with breakfast, 3 units with dinner. No insulin with lunch       ipratropium - albuterol 0.5 mg/2.5 mg/3 mL (DUONEB) 0.5-2.5 (3) MG/3ML neb solution Take 1 vial by nebulization 2 times daily       ipratropium - albuterol 0.5 mg/2.5 mg/3 mL (DUONEB) 0.5-2.5 (3) MG/3ML nebulization Take 1 vial by nebulization 4 times daily as needed for shortness of breath /  "dyspnea or wheezing        METFORMIN HCL PO Take 1,000 mg by mouth 2 times daily (with meals)        Mirtazapine (REMERON PO) Take 22.5 mg by mouth At Bedtime        nystatin (MYCOSTATIN) cream Apply to left abdomen topically every morning and at bedtime for redness apply until healed and then change to everyday PRN       polyvinyl alcohol (LIQUIFILM TEARS) 1.4 % ophthalmic solution Place 1 drop into both eyes 2 times daily       potassium chloride SA (K-DUR/KLOR-CON M) 10 MEQ CR tablet Take 1 tablet (10 mEq) by mouth 2 times daily 30 tablet 1     Skin Protectants, Misc. (HYDROCERIN) LOTN Apply to right lower leg topically as needed for dry skin apply to right leg once a day       sodium chloride (OCEAN) 0.65 % nasal spray Spray 2 sprays into both nostrils 2 times daily       Medications reviewed:  Medications reconciled to facility chart and changes were made to reflect current medications as identified as above med list.     Case Management:  I have reviewed the care plan and MDS and do agree with the plan. Patient's desire to return to the community is not assessible due to cognitive impairment.  Information reviewed:  Medications, vital signs, orders, and nursing notes.    ROS:  Reliability questionable secondary to cognitive impairment. Denies chest pain, shortness of breath, fevers, chills, headache, nausea, vomiting, dysuria or bowel abnormalities.    Exam:  Vitals: /61  Pulse 68  Temp 98  F (36.7  C)  Resp 18  Ht 5' 8\" (1.727 m)  Wt 175 lb (79.4 kg)  SpO2 100%  BMI 26.61 kg/m2  BMI= Body mass index is 26.61 kg/(m^2).  GENERAL APPEARANCE:  Dozing in chair, arouses easily to voice, no apparent distress  ENT:  Mouth and posterior oropharynx normal, moist mucous membranes, [place]  EYES:  EOM, conjunctivae, lids, pupils and irises normal  NECK:  No adenopathy,masses or thyromegaly  RESP:  respiratory effort and palpation of chest normal, lungs clear to auscultation , no respiratory distress  CV:  " Palpation and auscultation of heart done , regular rate and rhythm, no murmur, rub, or gallop, peripheral edema 3+ in RLE and 2+ LLE  ABDOMEN:  normal bowel sounds, soft, nontender, no hepatosplenomegaly or other masses  SKIN:  skin tear covered, otherwise no open areas, skin is thin and papery  PSYCH:  insight and judgement impaired, memory impaired , affect abnormal flat    Lab/Diagnostic data:     CBC RESULTS:   Recent Labs   Lab Test 03/28/18 09/20/17   WBC  7.4  7.4   RBC  4.21*  3.72*   HGB  12.5*  11.4*   HCT  38.8*  35.4*   MCV  92  95   MCH  29.7  30.6   MCHC  32.2  32.2   RDW  15.1*  13.5   PLT  268  268       Last Basic Metabolic Panel:  Recent Labs   Lab Test 03/28/18 09/20/17   NA  139  141   POTASSIUM  4.1  3.7   CHLORIDE  102  103   ELEANOR  9.3  9.2   CO2  28  30   BUN  24  16   CR  1.09  0.94   GLC  111*  129*       Liver Function Studies -   Recent Labs   Lab Test 07/13/16 02/09/14   1955   PROTTOTAL  6.4  4.9*   ALBUMIN  3.2  2.3*   BILITOTAL  0.4  0.4   ALKPHOS  106  100   AST  18  30   ALT  20  46       TSH   Date Value Ref Range Status   12/09/2015 2.67 mcU/mL Final   12/26/2013 2.55 0.4 - 5.0 mU/L Final     Lab Results   Component Value Date    A1C 7.1 03/28/2018    A1C 8.0 09/20/2017       ASSESSMENT/PLAN  (E11.65) Type 2 diabetes mellitus with hyperglycemia, unspecified long term insulin use status (H)  (primary encounter diagnosis)  Comment: Last A1c was lower than necessary, lunch dose of insulin was stopped. BG adequately controlled without hypoglycemia.  Plan: Continue current POC with no changes at this time and adjustments as needed.    (F03.90) Dementia without behavioral disturbance, unspecified dementia type  Comment: Comment: Chronic, progressive. Needs 24 hour skilled nursing care. HPI/ROS difficult to obtain with cognitive impairment. Expect further functional and cognitive decline. Expect weight loss.  Plan: Continue 24 hour care. Monitor weight. Monitor functional status. Monitor  for behavioral disturbances.    (I10) Benign essential hypertension  Comment: On lasix only. BP controlled  Plan: Continue current POC with no changes at this time and adjustments as needed.    (R60.0) Bilateral lower extremity edema  Comment: Worsening, gaining weight. In light of new wound, will treat more aggressively  Plan: Increase lasix to 40mg BID. Increase KCl to 20meq BID. BMP next week. Monitor weight, edema    Orders:  Increase lasix to 40mg BID  Increase KCl to 20meq BID  BMP next week.      Electronically signed by:  DRE Nicholson [facility]  Pager: [phone]

## 2018-09-19 NOTE — PROGRESS NOTES
[facility]    Chief Complaint   Patient presents with     [facility]       [place] Medical Record Number:  [id number]    HPI:    Titus Seay is a 89 year old  (10/27/1927), who is being seen today for a federally mandated E/M visit at [facility] .      HPI information obtained from: facility chart records, facility staff and patient report.     Today's concerns are:  Type 2 diabetes mellitus with hyperglycemia, unspecified long term insulin use status (H)  A1C 7.0 on 6/13/18  Last BG Levels:    AM: 152, 142, 181, 164, 183, 178, 154, 180, 163, 204, 179, 189    Dinner: 230, 188, 235, 216, 242, 152, 216, 235, 191, 160, 191, 221     Lab Results   Component Value Date    A1C 7.1 03/28/2018    A1C 8.0 09/20/2017       Dementia without behavioral disturbance, unspecified dementia type  Taking Remeron 22.5mg po at bedtime. Had some functional and cognitive decline in the past year, but this has stabilized now. Focus is comfort care. No behavioral issues. Occasionally has a fall when attempting self transfer.    Benign essential hypertension  BP readings range:  104/61   109/70   125/72  141/80  140/75  112/58  128/72  116/72  118/77  134/87  129/74  101/59     Bilateral lower extremity edema  Current regimen Furosemide 20mg po BID. No SOB, hypoxia. He just got a new skin tear on his leg this morning.   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 He also has no past medical history of Thyroid disease.  PAST SURGICAL HISTORY:  has a past surgical history that includes drain skin abscess simple (7/2007); REMOVE EYE W IMPLANT (1995); colonoscopy; Excise mass head (11/15/2012); TOTAL KNEE ARTHROPLASTY (8/2010); and Fusion cervical posterior one level (10/14/2013).  FAMILY HISTORY: family history includes Alzheimer Disease in his father; Breast Cancer in his sister; C.A.D. in his brother; Cancer - colorectal in his mother; Circulatory in his brother; Diabetes in his brother, mother, and sister. There is no history of Anesthesia Reaction, Osteoporosis, or Thyroid Disease.  SOCIAL HISTORY:  reports that he quit smoking about 34 years ago. His smoking use included Pipe. He quit after 30.00 years of use. He has never used smokeless tobacco. He reports that he does not drink alcohol or use illicit drugs.    MEDICATIONS:  Current Outpatient Prescriptions   Medication Sig Dispense Refill     Acetaminophen (TYLENOL PO) Take 1,000 mg by mouth 3 times daily       furosemide (LASIX) 20 MG tablet Take 1 tablet (20 mg) by mouth 2 times daily 30 tablet      insulin lispro (HUMALOG) 100 UNIT/ML injection 5 units with breakfast, 3 units with dinner. No insulin with lunch       ipratropium - albuterol 0.5 mg/2.5 mg/3 mL (DUONEB) 0.5-2.5 (3) MG/3ML neb solution Take 1 vial by nebulization 2 times daily       ipratropium - albuterol 0.5 mg/2.5 mg/3 mL (DUONEB) 0.5-2.5 (3) MG/3ML nebulization Take 1 vial by nebulization 4 times daily as needed for shortness of breath / dyspnea or wheezing        METFORMIN HCL PO Take 1,000 mg by mouth 2 times daily (with meals)         "Mirtazapine (REMERON PO) Take 22.5 mg by mouth At Bedtime        nystatin (MYCOSTATIN) cream Apply to left abdomen topically every morning and at bedtime for redness apply until healed and then change to everyday PRN       polyvinyl alcohol (LIQUIFILM TEARS) 1.4 % ophthalmic solution Place 1 drop into both eyes 2 times daily       potassium chloride SA (K-DUR/KLOR-CON M) 10 MEQ CR tablet Take 1 tablet (10 mEq) by mouth 2 times daily 30 tablet 1     Skin Protectants, Misc. (HYDROCERIN) LOTN Apply to right lower leg topically as needed for dry skin apply to right leg once a day       sodium chloride (OCEAN) 0.65 % nasal spray Spray 2 sprays into both nostrils 2 times daily       Medications reviewed:  Medications reconciled to facility chart and changes were made to reflect current medications as identified as above med list.     Case Management:  I have reviewed the care plan and MDS and do agree with the plan. Patient's desire to return to the community is not assessible due to cognitive impairment.  Information reviewed:  Medications, vital signs, orders, and nursing notes.    ROS:  Reliability questionable secondary to cognitive impairment. Denies chest pain, shortness of breath, fevers, chills, headache, nausea, vomiting, dysuria or bowel abnormalities.    Exam:  Vitals: /61  Pulse 68  Temp 98  F (36.7  C)  Resp 18  Ht 5' 8\" (1.727 m)  Wt 175 lb (79.4 kg)  SpO2 100%  BMI 26.61 kg/m2  BMI= Body mass index is 26.61 kg/(m^2).  GENERAL APPEARANCE:  Dozing in chair, arouses easily to voice, no apparent distress  ENT:  Mouth and posterior oropharynx normal, moist mucous membranes, [place]  EYES:  EOM, conjunctivae, lids, pupils and irises normal  NECK:  No adenopathy,masses or thyromegaly  RESP:  respiratory effort and palpation of chest normal, lungs clear to auscultation , no respiratory distress  CV:  Palpation and auscultation of heart done , regular rate and rhythm, no murmur, rub, or gallop, peripheral " edema 3+ in RLE and 2+ LLE  ABDOMEN:  normal bowel sounds, soft, nontender, no hepatosplenomegaly or other masses  SKIN:  skin tear covered, otherwise no open areas, skin is thin and papery  PSYCH:  insight and judgement impaired, memory impaired , affect abnormal flat    Lab/Diagnostic data:     CBC RESULTS:   Recent Labs   Lab Test 03/28/18 09/20/17   WBC  7.4  7.4   RBC  4.21*  3.72*   HGB  12.5*  11.4*   HCT  38.8*  35.4*   MCV  92  95   MCH  29.7  30.6   MCHC  32.2  32.2   RDW  15.1*  13.5   PLT  268  268       Last Basic Metabolic Panel:  Recent Labs   Lab Test 03/28/18 09/20/17   NA  139  141   POTASSIUM  4.1  3.7   CHLORIDE  102  103   ELEANOR  9.3  9.2   CO2  28  30   BUN  24  16   CR  1.09  0.94   GLC  111*  129*       Liver Function Studies -   Recent Labs   Lab Test 07/13/16 02/09/14   1955   PROTTOTAL  6.4  4.9*   ALBUMIN  3.2  2.3*   BILITOTAL  0.4  0.4   ALKPHOS  106  100   AST  18  30   ALT  20  46       TSH   Date Value Ref Range Status   12/09/2015 2.67 mcU/mL Final   12/26/2013 2.55 0.4 - 5.0 mU/L Final     Lab Results   Component Value Date    A1C 7.1 03/28/2018    A1C 8.0 09/20/2017       ASSESSMENT/PLAN  (E11.65) Type 2 diabetes mellitus with hyperglycemia, unspecified long term insulin use status (H)  (primary encounter diagnosis)  Comment: Last A1c was lower than necessary, lunch dose of insulin was stopped. BG adequately controlled without hypoglycemia.  Plan: Continue current POC with no changes at this time and adjustments as needed.    (F03.90) Dementia without behavioral disturbance, unspecified dementia type  Comment: Comment: Chronic, progressive. Needs 24 hour skilled nursing care. HPI/ROS difficult to obtain with cognitive impairment. Expect further functional and cognitive decline. Expect weight loss.  Plan: Continue 24 hour care. Monitor weight. Monitor functional status. Monitor for behavioral disturbances.    (I10) Benign essential hypertension  Comment: On lasix only. BP  controlled  Plan: Continue current POC with no changes at this time and adjustments as needed.    (R60.0) Bilateral lower extremity edema  Comment: Worsening, gaining weight. In light of new wound, will treat more aggressively  Plan: Increase lasix to 40mg BID. Increase KCl to 20meq BID. BMP next week. Monitor weight, edema    Orders:  Increase lasix to 40mg BID  Increase KCl to 20meq BID  BMP next week.      Electronically signed by:  DRE Nicholson [facility]  Pager: [phone]

## 2018-09-26 NOTE — PROGRESS NOTES
[facility]    Chief Complaint   Patient presents with     [facility]       [place] Medical Record Number:  [id number]    HPI:    Titus Seay is a 89 year old  (10/27/1927), who is being seen today for an episodic care visit at [facility] .      HPI information obtained from: facility chart records, facility staff and patient report.    Today's concern is:  Bilateral lower extremity edema  Furosemide dose doubled last week due to increased edema, new skin tear. No SOB, hypoxia.    Wt Readings from Last 4 Encounters:   09/26/18 170 lb (77.1 kg)   09/19/18 175 lb (79.4 kg)   07/18/18 164 lb 8 oz (74.6 kg)   05/30/18 162 lb (73.5 kg)     Type 2 diabetes mellitus with hyperglycemia, unspecified long term insulin use status (H)  A1C 7.0 on 6/13/18  Last BG Levels:    AM: 204, 179, 189, 168, 223, 205, 141, 163, 171, 184    Dinner: 191, 221, 245, 266, 260, 193, 245, 201, 217    Lab Results   Component Value Date    A1C 7.1 03/28/2018    A1C 8.0 09/20/2017       Dementia without behavioral disturbance, unspecified dementia type  Taking Remeron 22.5mg po at bedtime. Had some functional and cognitive decline in the past year, but this has stabilized now. Focus is comfort care. No behavioral issues. Occasionally has a fall when attempting self transfer.    Benign essential hypertension  BP readings range:  119/74  122/77  118/77  104/61   109/70   125/72  141/80  140/75  112/58  128/72  116/72  118/77      ALLERGIES: Sulfa drugs and Bactrim [sulfamethoxazole w/trimethoprim]  Past Medical, Surgical, Family and Social History reviewed and updated in [facility].    Current Outpatient Prescriptions   Medication Sig Dispense Refill     Acetaminophen (TYLENOL PO) Take 1,000 mg by mouth 3 times daily       Furosemide (LASIX PO) Take 20 mg by mouth 2 times daily       insulin lispro (HUMALOG) 100 UNIT/ML injection 5 units with breakfast, 3 units with dinner. No insulin with lunch       ipratropium -  "albuterol 0.5 mg/2.5 mg/3 mL (DUONEB) 0.5-2.5 (3) MG/3ML neb solution Take 1 vial by nebulization 2 times daily       ipratropium - albuterol 0.5 mg/2.5 mg/3 mL (DUONEB) 0.5-2.5 (3) MG/3ML nebulization Take 1 vial by nebulization 4 times daily as needed for shortness of breath / dyspnea or wheezing        METFORMIN HCL PO Take 1,000 mg by mouth 2 times daily (with meals)        Mirtazapine (REMERON PO) Take 22.5 mg by mouth At Bedtime        nystatin (MYCOSTATIN) cream Apply to left abdomen topically every morning and at bedtime for redness apply until healed and then change to everyday PRN       polyvinyl alcohol (LIQUIFILM TEARS) 1.4 % ophthalmic solution Place 1 drop into both eyes 2 times daily       POTASSIUM CHLORIDE PO Take 40 mEq by mouth 2 times daily       Skin Protectants, Misc. (HYDROCERIN) LOTN Apply to right lower leg topically as needed for dry skin apply to right leg once a day       sodium chloride (OCEAN) 0.65 % nasal spray Spray 2 sprays into both nostrils 2 times daily       Medications reconciled to facility chart and changes were made to reflect current medications as identified as above med list.     REVIEW OF SYSTEMS:  Reliability questionable secondary to cognitive impairment. Denies chest pain, shortness of breath, fevers, chills, headache, nausea, vomiting, dysuria or bowel abnormalities. No pain.    Physical Exam:  /74  Pulse 70  Temp 98.4  F (36.9  C)  Resp 18  Ht 5' 8.3\" (1.735 m)  Wt 170 lb (77.1 kg)  SpO2 100%  BMI 25.62 kg/m2   GENERAL APPEARANCE:  Dozing in chair, arouses easily to voice, no apparent distress  ENT:  Mouth and posterior oropharynx normal, moist mucous membranes, [place]  EYES:  EOM, conjunctivae, lids, pupils and irises normal  NECK:  No adenopathy,masses or thyromegaly  RESP:  respiratory effort and palpation of chest normal, lungs clear to auscultation , no respiratory distress  CV:  Palpation and auscultation of heart done , regular rate and rhythm, no " murmur, rub, or gallop, only trace edema in RLE   ABDOMEN:  normal bowel sounds, soft, nontender, no hepatosplenomegaly or other masses  SKIN:  skin tear covered, otherwise no open areas, skin is thin and papery  PSYCH:  insight and judgement impaired, memory impaired , affect abnormal flat      Recent Labs:   9/26/18  Na 141  K 4.0  BUN 26  Creat 1.05    CBC RESULTS:   Recent Labs   Lab Test 03/28/18 09/20/17   WBC  7.4  7.4   RBC  4.21*  3.72*   HGB  12.5*  11.4*   HCT  38.8*  35.4*   MCV  92  95   MCH  29.7  30.6   MCHC  32.2  32.2   RDW  15.1*  13.5   PLT  268  268       Last Basic Metabolic Panel:  Recent Labs   Lab Test 03/28/18 09/20/17   NA  139  141   POTASSIUM  4.1  3.7   CHLORIDE  102  103   ELEANOR  9.3  9.2   CO2  28  30   BUN  24  16   CR  1.09  0.94   GLC  111*  129*       Liver Function Studies -   Recent Labs   Lab Test 07/13/16 02/09/14   1955   PROTTOTAL  6.4  4.9*   ALBUMIN  3.2  2.3*   BILITOTAL  0.4  0.4   ALKPHOS  106  100   AST  18  30   ALT  20  46       Lab Results   Component Value Date    A1C 7.1 03/28/2018    A1C 8.0 09/20/2017         Assessment/Plan:  (R60.0) Bilateral lower extremity edema (primary encounter diagnosis)  Comment: Improving with increased lasix, no sign of dehydration. Will continue with increased dose for now as his edema had gotten significant  Plan: Cont lasix 40mg BID. BMP 2 weeks. Monitor weight, edema    (E11.65) Type 2 diabetes mellitus with hyperglycemia, unspecified long term insulin use status (H)   Comment: Last A1c was lower than necessary, lunch dose of insulin was stopped. BG adequately controlled without hypoglycemia.  Plan: Continue current POC with no changes at this time and adjustments as needed.    (F03.90) Dementia without behavioral disturbance, unspecified dementia type  Comment: Chronic, progressive. Needs 24 hour skilled nursing care. HPI/ROS difficult to obtain with cognitive impairment. Expect further functional and cognitive decline. Expect  weight loss.  Plan: Continue 24 hour care. Monitor weight. Monitor functional status. Monitor for behavioral disturbances.    (I10) Benign essential hypertension  Comment: On lasix only. BP controlled  Plan: Continue current POC with no changes at this time and adjustments as needed.      Orders:  BMP, A1c 10/10/18      Electronically signed by  DRE Nicholson [facility]  Cell: [phone]

## 2018-09-26 NOTE — LETTER
9/26/2018        RE: Titus Seay  [address] [address] [place] [address]        [facility]    Chief Complaint   Patient presents with     CELE       [place] Medical Record Number:  [id number]    HPI:    Titus Seay is a 89 year old  (10/27/1927), who is being seen today for an episodic care visit at [facility] .      HPI information obtained from: facility chart records, facility staff and patient report.    Today's concern is:  Bilateral lower extremity edema  Furosemide dose doubled last week due to increased edema, new skin tear. No SOB, hypoxia.    Wt Readings from Last 4 Encounters:   09/26/18 170 lb (77.1 kg)   09/19/18 175 lb (79.4 kg)   07/18/18 164 lb 8 oz (74.6 kg)   05/30/18 162 lb (73.5 kg)     Type 2 diabetes mellitus with hyperglycemia, unspecified long term insulin use status (H)  A1C 7.0 on 6/13/18  Last BG Levels:    AM: 204, 179, 189, 168, 223, 205, 141, 163, 171, 184    Dinner: 191, 221, 245, 266, 260, 193, 245, 201, 217    Lab Results   Component Value Date    A1C 7.1 03/28/2018    A1C 8.0 09/20/2017       Dementia without behavioral disturbance, unspecified dementia type  Taking Remeron 22.5mg po at bedtime. Had some functional and cognitive decline in the past year, but this has stabilized now. Focus is comfort care. No behavioral issues. Occasionally has a fall when attempting self transfer.    Benign essential hypertension  BP readings range:  119/74  122/77  118/77  104/61   109/70   125/72  141/80  140/75  112/58  128/72  116/72  118/77      ALLERGIES: Sulfa drugs and Bactrim [sulfamethoxazole w/trimethoprim]  Past Medical, Surgical, Family and Social History reviewed and updated in EPIC.    Current Outpatient Prescriptions   Medication Sig Dispense Refill     Acetaminophen (TYLENOL PO) Take 1,000 mg by mouth 3 times daily       Furosemide (LASIX PO) Take 20 mg by mouth 2 times daily       insulin lispro (HUMALOG) 100 UNIT/ML injection  "5 units with breakfast, 3 units with dinner. No insulin with lunch       ipratropium - albuterol 0.5 mg/2.5 mg/3 mL (DUONEB) 0.5-2.5 (3) MG/3ML neb solution Take 1 vial by nebulization 2 times daily       ipratropium - albuterol 0.5 mg/2.5 mg/3 mL (DUONEB) 0.5-2.5 (3) MG/3ML nebulization Take 1 vial by nebulization 4 times daily as needed for shortness of breath / dyspnea or wheezing        METFORMIN HCL PO Take 1,000 mg by mouth 2 times daily (with meals)        Mirtazapine (REMERON PO) Take 22.5 mg by mouth At Bedtime        nystatin (MYCOSTATIN) cream Apply to left abdomen topically every morning and at bedtime for redness apply until healed and then change to everyday PRN       polyvinyl alcohol (LIQUIFILM TEARS) 1.4 % ophthalmic solution Place 1 drop into both eyes 2 times daily       POTASSIUM CHLORIDE PO Take 40 mEq by mouth 2 times daily       Skin Protectants, Misc. (HYDROCERIN) LOTN Apply to right lower leg topically as needed for dry skin apply to right leg once a day       sodium chloride (OCEAN) 0.65 % nasal spray Spray 2 sprays into both nostrils 2 times daily       Medications reconciled to facility chart and changes were made to reflect current medications as identified as above med list.     REVIEW OF SYSTEMS:  Reliability questionable secondary to cognitive impairment. Denies chest pain, shortness of breath, fevers, chills, headache, nausea, vomiting, dysuria or bowel abnormalities. No pain.    Physical Exam:  /74  Pulse 70  Temp 98.4  F (36.9  C)  Resp 18  Ht 5' 8.3\" (1.735 m)  Wt 170 lb (77.1 kg)  SpO2 100%  BMI 25.62 kg/m2   GENERAL APPEARANCE:  Dozing in chair, arouses easily to voice, no apparent distress  ENT:  Mouth and posterior oropharynx normal, moist mucous membranes, [place]  EYES:  EOM, conjunctivae, lids, pupils and irises normal  NECK:  No adenopathy,masses or thyromegaly  RESP:  respiratory effort and palpation of chest normal, lungs clear to auscultation , no respiratory " distress  CV:  Palpation and auscultation of heart done , regular rate and rhythm, no murmur, rub, or gallop, only trace edema in RLE   ABDOMEN:  normal bowel sounds, soft, nontender, no hepatosplenomegaly or other masses  SKIN:  skin tear covered, otherwise no open areas, skin is thin and papery  PSYCH:  insight and judgement impaired, memory impaired , affect abnormal flat      Recent Labs:   9/26/18  Na 141  K 4.0  BUN 26  Creat 1.05    CBC RESULTS:   Recent Labs   Lab Test 03/28/18 09/20/17   WBC  7.4  7.4   RBC  4.21*  3.72*   HGB  12.5*  11.4*   HCT  38.8*  35.4*   MCV  92  95   MCH  29.7  30.6   MCHC  32.2  32.2   RDW  15.1*  13.5   PLT  268  268       Last Basic Metabolic Panel:  Recent Labs   Lab Test 03/28/18 09/20/17   NA  139  141   POTASSIUM  4.1  3.7   CHLORIDE  102  103   ELEANOR  9.3  9.2   CO2  28  30   BUN  24  16   CR  1.09  0.94   GLC  111*  129*       Liver Function Studies -   Recent Labs   Lab Test 07/13/16 02/09/14   1955   PROTTOTAL  6.4  4.9*   ALBUMIN  3.2  2.3*   BILITOTAL  0.4  0.4   ALKPHOS  106  100   AST  18  30   ALT  20  46       Lab Results   Component Value Date    A1C 7.1 03/28/2018    A1C 8.0 09/20/2017         Assessment/Plan:  (R60.0) Bilateral lower extremity edema (primary encounter diagnosis)  Comment: Improving with increased lasix, no sign of dehydration. Will continue with increased dose for now as his edema had gotten significant  Plan: Cont lasix 40mg BID. BMP 2 weeks. Monitor weight, edema    (E11.65) Type 2 diabetes mellitus with hyperglycemia, unspecified long term insulin use status (H)   Comment: Last A1c was lower than necessary, lunch dose of insulin was stopped. BG adequately controlled without hypoglycemia.  Plan: Continue current POC with no changes at this time and adjustments as needed.    (F03.90) Dementia without behavioral disturbance, unspecified dementia type  Comment: Chronic, progressive. Needs 24 hour skilled nursing care. HPI/ROS difficult to obtain  with cognitive impairment. Expect further functional and cognitive decline. Expect weight loss.  Plan: Continue 24 hour care. Monitor weight. Monitor functional status. Monitor for behavioral disturbances.    (I10) Benign essential hypertension  Comment: On lasix only. BP controlled  Plan: Continue current POC with no changes at this time and adjustments as needed.      Orders:  BMP, A1c 10/10/18      Electronically signed by  DRE Nicholson [facility]  Cell: [phone]

## 2018-10-10 NOTE — LETTER
10/10/2018        RE: Titus Seay  [address] [place] [address]        [place] [facility]    Chief Complaint   Patient presents with     CELE       [facility] Medical Record Number:  [id number]    HPI:    Titus Seay is a 89 year old  (10/27/1927), who is being seen today for an episodic care visit at [facility] .      HPI information obtained from: facility chart records, facility staff and patient report.    Today's concern is:  Bilateral lower extremity edema  Furosemide dose doubled 9/19/18 due to increased edema, new skin tear. No SOB, hypoxia. BMP ordered for today was missed  Wt Readings from Last 4 Encounters:   10/10/18 170 lb (77.1 kg)   09/26/18 170 lb (77.1 kg)   09/19/18 175 lb (79.4 kg)   07/18/18 164 lb 8 oz (74.6 kg)     Type 2 diabetes mellitus with hyperglycemia, unspecified long term insulin use status (H)  A1C 7.0 on 6/13/18. A1c ordered for today was missed  Last BG Levels:    AM: 224, 215, 186, 162, 168, 159, 164, 186, 225, 187    Dinner: 309, 205, 230, 192, 162, 209, 235, 150, 196    Lab Results   Component Value Date    A1C 7.1 03/28/2018    A1C 8.0 09/20/2017       Dementia without behavioral disturbance, unspecified dementia type  Taking Remeron 22.5mg po at bedtime. Had some functional and cognitive decline in the past year, but this has stabilized now. Focus is comfort care. No behavioral issues. Occasionally has a fall when attempting self transfer.    Benign essential hypertension  BP readings range:  127/79  127/68  123/75  109/60  135/79  105/64  119/74  122/77  118/77  104/61   109/70       ALLERGIES: Sulfa drugs and Bactrim [sulfamethoxazole w/trimethoprim]  Past Medical, Surgical, Family and Social History reviewed and updated in EPIC.    Current Outpatient Prescriptions   Medication Sig Dispense Refill     Acetaminophen (TYLENOL PO) Take 1,000 mg by mouth 3 times daily       Furosemide (LASIX PO) Take 40 mg by mouth 2 times  "daily        insulin lispro (HUMALOG) 100 UNIT/ML injection 5 units with breakfast, 3 units with dinner. No insulin with lunch       ipratropium - albuterol 0.5 mg/2.5 mg/3 mL (DUONEB) 0.5-2.5 (3) MG/3ML neb solution Take 1 vial by nebulization 2 times daily       ipratropium - albuterol 0.5 mg/2.5 mg/3 mL (DUONEB) 0.5-2.5 (3) MG/3ML nebulization Take 1 vial by nebulization 4 times daily as needed for shortness of breath / dyspnea or wheezing        METFORMIN HCL PO Take 1,000 mg by mouth 2 times daily (with meals)        Mirtazapine (REMERON PO) Take 22.5 mg by mouth At Bedtime        nystatin (MYCOSTATIN) cream Apply to left abdomen topically every morning and at bedtime for redness apply until healed and then change to everyday PRN       polyvinyl alcohol (LIQUIFILM TEARS) 1.4 % ophthalmic solution Place 1 drop into both eyes 2 times daily       POTASSIUM CHLORIDE PO Take 20 mEq by mouth 2 times daily        Skin Protectants, Misc. (HYDROCERIN) LOTN Apply to right lower leg topically as needed for dry skin apply to right leg once a day       sodium chloride (OCEAN) 0.65 % nasal spray Spray 2 sprays into both nostrils 2 times daily       Medications reconciled to facility chart and changes were made to reflect current medications as identified as above med list.     REVIEW OF SYSTEMS:  Reliability questionable secondary to cognitive impairment. Denies chest pain, shortness of breath, fevers, chills, headache, nausea, vomiting, dysuria or bowel abnormalities. No pain.    Physical Exam:  /79  Pulse 83  Temp 99  F (37.2  C)  Resp 18  Ht 5' 8.3\" (1.735 m)  Wt 170 lb (77.1 kg)  SpO2 96%  BMI 25.62 kg/m2   GENERAL APPEARANCE:  Dozing in chair, arouses easily to voice, no apparent distress  ENT:  Mouth and posterior oropharynx normal, moist mucous membranes, [place]  EYES:  EOM, conjunctivae, lids, pupils and irises normal  NECK:  No adenopathy,masses or thyromegaly  RESP:  respiratory effort and palpation of " chest normal, lungs clear to auscultation , no respiratory distress  CV:  Palpation and auscultation of heart done , regular rate and rhythm, no murmur, rub, or gallop, only trace edema in RLE   ABDOMEN:  normal bowel sounds, soft, nontender, no hepatosplenomegaly or other masses  SKIN:  skin tear covered, otherwise no open areas, skin is thin and papery  PSYCH:  insight and judgement impaired, memory impaired , affect abnormal flat      Recent Labs:   9/26/18  Na 141  K 4.0  BUN 26  Creat 1.05    CBC RESULTS:   Recent Labs   Lab Test 03/28/18 09/20/17   WBC  7.4  7.4   RBC  4.21*  3.72*   HGB  12.5*  11.4*   HCT  38.8*  35.4*   MCV  92  95   MCH  29.7  30.6   MCHC  32.2  32.2   RDW  15.1*  13.5   PLT  268  268       Last Basic Metabolic Panel:  Recent Labs   Lab Test 03/28/18 09/20/17   NA  139  141   POTASSIUM  4.1  3.7   CHLORIDE  102  103   ELEANOR  9.3  9.2   CO2  28  30   BUN  24  16   CR  1.09  0.94   GLC  111*  129*       Liver Function Studies -   Recent Labs   Lab Test 07/13/16 02/09/14   1955   PROTTOTAL  6.4  4.9*   ALBUMIN  3.2  2.3*   BILITOTAL  0.4  0.4   ALKPHOS  106  100   AST  18  30   ALT  20  46       TSH   Date Value Ref Range Status   12/09/2015 2.67 mcU/mL Final   12/26/2013 2.55 0.4 - 5.0 mU/L Final     Lab Results   Component Value Date    A1C 7.1 03/28/2018    A1C 8.0 09/20/2017           Assessment/Plan:  (R60.0) Bilateral lower extremity edema (primary encounter diagnosis)  Comment: Improved with increased lasix, no sign of dehydration, but labs not available. Will continue with increased dose for now as his edema had gotten significant. Will reorder BMP for next week. If any sign of dehydration on labs, will decrease lasix again  Plan: Cont lasix 40mg BID. BMP 10/15/18. Monitor weight, edema    (E11.65) Type 2 diabetes mellitus with hyperglycemia, unspecified long term insulin use status (H)   Comment: Last A1c was lower than necessary, lunch dose of insulin was stopped. BG fair control  without hypoglycemia. HgA1c <8% not recommended in elderly due to risk of hypoglycemia. Risk outweighs benefit of tighter control  Plan: A1c reordered for 10/15/18. Adjust medication as clinically indicated.    (F03.90) Dementia without behavioral disturbance, unspecified dementia type  Comment: Chronic, progressive. Needs 24 hour skilled nursing care. HPI/ROS difficult to obtain with cognitive impairment. Expect further functional and cognitive decline. Expect weight loss.  Plan: Continue 24 hour care. Monitor weight. Monitor functional status. Monitor for behavioral disturbances.    (I10) Benign essential hypertension  Comment: On lasix only. BP controlled, no hypotension  Plan: Continue current POC with no changes at this time and adjustments as needed.      Orders:  BMP, A1c 10/15/18      Electronically signed by  DRE Nicholson [facility]  Cell: [phone]

## 2018-10-10 NOTE — PROGRESS NOTES
[place] [facility]    Chief Complaint   Patient presents with     CELE       [facility] Medical Record Number:  [id number]    HPI:    Titus Seay is a 89 year old  (10/27/1927), who is being seen today for an episodic care visit at [facility] .      HPI information obtained from: facility chart records, facility staff and patient report.    Today's concern is:  Bilateral lower extremity edema  Furosemide dose doubled 9/19/18 due to increased edema, new skin tear. No SOB, hypoxia. BMP ordered for today was missed  Wt Readings from Last 4 Encounters:   10/10/18 170 lb (77.1 kg)   09/26/18 170 lb (77.1 kg)   09/19/18 175 lb (79.4 kg)   07/18/18 164 lb 8 oz (74.6 kg)     Type 2 diabetes mellitus with hyperglycemia, unspecified long term insulin use status (H)  A1C 7.0 on 6/13/18. A1c ordered for today was missed  Last BG Levels:    AM: 224, 215, 186, 162, 168, 159, 164, 186, 225, 187    Dinner: 309, 205, 230, 192, 162, 209, 235, 150, 196    Lab Results   Component Value Date    A1C 7.1 03/28/2018    A1C 8.0 09/20/2017       Dementia without behavioral disturbance, unspecified dementia type  Taking Remeron 22.5mg po at bedtime. Had some functional and cognitive decline in the past year, but this has stabilized now. Focus is comfort care. No behavioral issues. Occasionally has a fall when attempting self transfer.    Benign essential hypertension  BP readings range:  127/79  127/68  123/75  109/60  135/79  105/64  119/74  122/77  118/77  104/61   109/70       ALLERGIES: Sulfa drugs and Bactrim [sulfamethoxazole w/trimethoprim]  Past Medical, Surgical, Family and Social History reviewed and updated in EPIC.    Current Outpatient Prescriptions   Medication Sig Dispense Refill     Acetaminophen (TYLENOL PO) Take 1,000 mg by mouth 3 times daily       Furosemide (LASIX PO) Take 40 mg by mouth 2 times daily        insulin lispro (HUMALOG) 100 UNIT/ML injection 5 units with breakfast, 3 units with  "dinner. No insulin with lunch       ipratropium - albuterol 0.5 mg/2.5 mg/3 mL (DUONEB) 0.5-2.5 (3) MG/3ML neb solution Take 1 vial by nebulization 2 times daily       ipratropium - albuterol 0.5 mg/2.5 mg/3 mL (DUONEB) 0.5-2.5 (3) MG/3ML nebulization Take 1 vial by nebulization 4 times daily as needed for shortness of breath / dyspnea or wheezing        METFORMIN HCL PO Take 1,000 mg by mouth 2 times daily (with meals)        Mirtazapine (REMERON PO) Take 22.5 mg by mouth At Bedtime        nystatin (MYCOSTATIN) cream Apply to left abdomen topically every morning and at bedtime for redness apply until healed and then change to everyday PRN       polyvinyl alcohol (LIQUIFILM TEARS) 1.4 % ophthalmic solution Place 1 drop into both eyes 2 times daily       POTASSIUM CHLORIDE PO Take 20 mEq by mouth 2 times daily        Skin Protectants, Misc. (HYDROCERIN) LOTN Apply to right lower leg topically as needed for dry skin apply to right leg once a day       sodium chloride (OCEAN) 0.65 % nasal spray Spray 2 sprays into both nostrils 2 times daily       Medications reconciled to facility chart and changes were made to reflect current medications as identified as above med list.     REVIEW OF SYSTEMS:  Reliability questionable secondary to cognitive impairment. Denies chest pain, shortness of breath, fevers, chills, headache, nausea, vomiting, dysuria or bowel abnormalities. No pain.    Physical Exam:  /79  Pulse 83  Temp 99  F (37.2  C)  Resp 18  Ht 5' 8.3\" (1.735 m)  Wt 170 lb (77.1 kg)  SpO2 96%  BMI 25.62 kg/m2   GENERAL APPEARANCE:  Dozing in chair, arouses easily to voice, no apparent distress  ENT:  Mouth and posterior oropharynx normal, moist mucous membranes, [place]  EYES:  EOM, conjunctivae, lids, pupils and irises normal  NECK:  No adenopathy,masses or thyromegaly  RESP:  respiratory effort and palpation of chest normal, lungs clear to auscultation , no respiratory distress  CV:  Palpation and " auscultation of heart done , regular rate and rhythm, no murmur, rub, or gallop, only trace edema in RLE   ABDOMEN:  normal bowel sounds, soft, nontender, no hepatosplenomegaly or other masses  SKIN:  skin tear covered, otherwise no open areas, skin is thin and papery  PSYCH:  insight and judgement impaired, memory impaired , affect abnormal flat      Recent Labs:   9/26/18  Na 141  K 4.0  BUN 26  Creat 1.05    CBC RESULTS:   Recent Labs   Lab Test 03/28/18 09/20/17   WBC  7.4  7.4   RBC  4.21*  3.72*   HGB  12.5*  11.4*   HCT  38.8*  35.4*   MCV  92  95   MCH  29.7  30.6   MCHC  32.2  32.2   RDW  15.1*  13.5   PLT  268  268       Last Basic Metabolic Panel:  Recent Labs   Lab Test 03/28/18 09/20/17   NA  139  141   POTASSIUM  4.1  3.7   CHLORIDE  102  103   ELEANOR  9.3  9.2   CO2  28  30   BUN  24  16   CR  1.09  0.94   GLC  111*  129*       Liver Function Studies -   Recent Labs   Lab Test 07/13/16 02/09/14   1955   PROTTOTAL  6.4  4.9*   ALBUMIN  3.2  2.3*   BILITOTAL  0.4  0.4   ALKPHOS  106  100   AST  18  30   ALT  20  46       TSH   Date Value Ref Range Status   12/09/2015 2.67 mcU/mL Final   12/26/2013 2.55 0.4 - 5.0 mU/L Final     Lab Results   Component Value Date    A1C 7.1 03/28/2018    A1C 8.0 09/20/2017           Assessment/Plan:  (R60.0) Bilateral lower extremity edema (primary encounter diagnosis)  Comment: Improved with increased lasix, no sign of dehydration, but labs not available. Will continue with increased dose for now as his edema had gotten significant. Will reorder BMP for next week. If any sign of dehydration on labs, will decrease lasix again  Plan: Cont lasix 40mg BID. BMP 10/15/18. Monitor weight, edema    (E11.65) Type 2 diabetes mellitus with hyperglycemia, unspecified long term insulin use status (H)   Comment: Last A1c was lower than necessary, lunch dose of insulin was stopped. BG fair control without hypoglycemia. HgA1c <8% not recommended in elderly due to risk of hypoglycemia.  Risk outweighs benefit of tighter control  Plan: A1c reordered for 10/15/18. Adjust medication as clinically indicated.    (F03.90) Dementia without behavioral disturbance, unspecified dementia type  Comment: Chronic, progressive. Needs 24 hour skilled nursing care. HPI/ROS difficult to obtain with cognitive impairment. Expect further functional and cognitive decline. Expect weight loss.  Plan: Continue 24 hour care. Monitor weight. Monitor functional status. Monitor for behavioral disturbances.    (I10) Benign essential hypertension  Comment: On lasix only. BP controlled, no hypotension  Plan: Continue current POC with no changes at this time and adjustments as needed.      Orders:  BMP, A1c 10/15/18      Electronically signed by  DRE Nicholson [facility]  Cell: [phone]

## 2018-10-31 NOTE — LETTER
10/31/2018        RE: Titus Seay  [address] [facility] [place] [address]        [place] [facility]    Chief Complaint   Patient presents with     CELE       [place] Medical Record Number:  [id number]    HPI:    Titus Seay is a 89 year old  (10/27/1927), who is being seen today for an episodic care visit at [facility] .      HPI information obtained from: facility chart records, facility staff and patient report.    Today's concern is:  Bilateral lower extremity edema  Furosemide dose doubled 9/19/18 due to increased edema, new skin tear. No SOB, hypoxia. BMP ordered for today was missed.   Wt Readings from Last 4 Encounters:   10/31/18 173 lb (78.5 kg)   10/10/18 170 lb (77.1 kg)   09/26/18 170 lb (77.1 kg)   09/19/18 175 lb (79.4 kg)     Type 2 diabetes mellitus with hyperglycemia, unspecified long term insulin use status (H)  Last BG Levels:    AM: 199, 181, 172, 179, 155, 164, 167, 160, 161, 177    Dinner: 158, 184, 159, 173, 180 ,167, 236, 150, 206    Lab Results   Component Value Date    A1C 7.3 10/15/2018    A1C 7.1 03/28/2018    A1C 8.0 09/20/2017       Dementia without behavioral disturbance, unspecified dementia type  Taking Remeron 22.5mg po at bedtime. Had some functional and cognitive decline in the past year, but this has stabilized now. Focus is comfort care. No behavioral issues. Occasionally has a fall when attempting self transfer.    Benign essential hypertension  BP readings range:  118/75  120/68  133/72  119/71  127/71  121/80  111/73  127/79  127/68  123/75  109/60  135/79  105/64  119/74      ALLERGIES: Sulfa drugs and Bactrim [sulfamethoxazole w/trimethoprim]  Past Medical, Surgical, Family and Social History reviewed and updated in EPIC.    Current Outpatient Prescriptions   Medication Sig Dispense Refill     Acetaminophen (TYLENOL PO) Take 1,000 mg by mouth 3 times daily       Furosemide (LASIX PO) Take 40 mg by mouth 2 times daily         "insulin lispro (HUMALOG) 100 UNIT/ML injection 5 units with breakfast, 3 units with dinner. No insulin with lunch       ipratropium - albuterol 0.5 mg/2.5 mg/3 mL (DUONEB) 0.5-2.5 (3) MG/3ML neb solution Take 1 vial by nebulization 2 times daily       ipratropium - albuterol 0.5 mg/2.5 mg/3 mL (DUONEB) 0.5-2.5 (3) MG/3ML nebulization Take 1 vial by nebulization 4 times daily as needed for shortness of breath / dyspnea or wheezing        METFORMIN HCL PO Take 1,000 mg by mouth 2 times daily (with meals)        Mirtazapine (REMERON PO) Take 22.5 mg by mouth At Bedtime        nystatin (MYCOSTATIN) cream Apply to left abdomen topically every morning and at bedtime for redness apply until healed and then change to everyday PRN       polyvinyl alcohol (LIQUIFILM TEARS) 1.4 % ophthalmic solution Place 1 drop into both eyes 2 times daily       POTASSIUM CHLORIDE PO Take 20 mEq by mouth 2 times daily        Skin Protectants, Misc. (HYDROCERIN) LOTN Apply to right lower leg topically as needed for dry skin apply to right leg once a day       sodium chloride (OCEAN) 0.65 % nasal spray Spray 2 sprays into both nostrils 2 times daily       Medications reconciled to facility chart and changes were made to reflect current medications as identified as above med list.     REVIEW OF SYSTEMS:  Reliability questionable secondary to cognitive impairment. Denies chest pain, shortness of breath, fevers, chills, headache, nausea, vomiting, dysuria or bowel abnormalities. No pain.    Physical Exam:  /67  Pulse 63  Temp 97.4  F (36.3  C)  Resp 16  Ht 5' 8.3\" (1.735 m)  Wt 173 lb (78.5 kg)  SpO2 95%  BMI 26.07 kg/m2   GENERAL APPEARANCE:  Dozing in chair, arouses easily to voice, no apparent distress  ENT:  Mouth and posterior oropharynx normal, moist mucous membranes, [place]  EYES:  EOM, conjunctivae, lids, pupils and irises normal  NECK:  No adenopathy,masses or thyromegaly  RESP:  respiratory effort and palpation of chest normal, " lungs clear to auscultation , no respiratory distress  CV:  Palpation and auscultation of heart done , regular rate and rhythm, no murmur, rub, or gallop, 2+ edema in RLE, 1+ LLE  ABDOMEN:  normal bowel sounds, soft, nontender, no hepatosplenomegaly or other masses  SKIN: skin is thin, dry, and papery  PSYCH:  insight and judgement impaired, memory impaired , affect abnormal flat      Recent Labs:   CBC RESULTS:   Recent Labs   Lab Test 03/28/18 09/20/17   WBC  7.4  7.4   RBC  4.21*  3.72*   HGB  12.5*  11.4*   HCT  38.8*  35.4*   MCV  92  95   MCH  29.7  30.6   MCHC  32.2  32.2   RDW  15.1*  13.5   PLT  268  268       Last Basic Metabolic Panel:  Recent Labs   Lab Test 10/15/18 09/26/18   NA  140  141   POTASSIUM  4.3  4.0   CHLORIDE  105  104   ELEANOR  9.1  9.4   CO2  28  30   BUN  28  26   CR  1.16  1.05   GLC  155*  129*       Liver Function Studies -   Recent Labs   Lab Test 07/13/16 02/09/14   1955   PROTTOTAL  6.4  4.9*   ALBUMIN  3.2  2.3*   BILITOTAL  0.4  0.4   ALKPHOS  106  100   AST  18  30   ALT  20  46       Lab Results   Component Value Date    A1C 7.3 10/15/2018    A1C 7.1 03/28/2018           Assessment/Plan:  (R60.0) Bilateral lower extremity edema (primary encounter diagnosis)  Comment: Improved with increased lasix, weight up today, but this may be related to something he ate. Will continue with increased dose for now as his edema had gotten significant. Will reorder BMP for tomorrow. If any sign of dehydration on labs, will decrease lasix again  Plan: Cont lasix 40mg BID. BMP 11/1/18. Monitor weight, edema    (E11.65) Type 2 diabetes mellitus with hyperglycemia, unspecified long term insulin use status (H)   Comment: BG fair control without hypoglycemia. HgA1c <8% not recommended in elderly due to risk of hypoglycemia. Risk outweighs benefit of tighter control  Plan: Continue current POC with no changes at this time. Adjust medication as clinically indicated.    (F03.90) Dementia without  behavioral disturbance, unspecified dementia type  Comment: Chronic, progressive. Needs 24 hour skilled nursing care. HPI/ROS difficult to obtain with cognitive impairment. Expect further functional and cognitive decline. Expect weight loss.  Plan: Continue 24 hour care. Monitor weight. Monitor functional status. Monitor for behavioral disturbances.    (I10) Benign essential hypertension  Comment: On lasix only. BP controlled, no hypotension  Plan: Continue current POC with no changes at this time and adjustments as needed.      Orders:  CHARLES 11/1/18      Electronically signed by  DRE [facility]  Cell: [phone]

## 2018-10-31 NOTE — PROGRESS NOTES
[place] [facility]    Chief Complaint   Patient presents with     CELE       [place] Medical Record Number:  [id number]    HPI:    Titus Seay is a 89 year old  (10/27/1927), who is being seen today for an episodic care visit at [facility] .      HPI information obtained from: facility chart records, facility staff and patient report.    Today's concern is:  Bilateral lower extremity edema  Furosemide dose doubled 9/19/18 due to increased edema, new skin tear. No SOB, hypoxia. BMP ordered for today was missed.   Wt Readings from Last 4 Encounters:   10/31/18 173 lb (78.5 kg)   10/10/18 170 lb (77.1 kg)   09/26/18 170 lb (77.1 kg)   09/19/18 175 lb (79.4 kg)     Type 2 diabetes mellitus with hyperglycemia, unspecified long term insulin use status (H)  Last BG Levels:    AM: 199, 181, 172, 179, 155, 164, 167, 160, 161, 177    Dinner: 158, 184, 159, 173, 180 ,167, 236, 150, 206    Lab Results   Component Value Date    A1C 7.3 10/15/2018    A1C 7.1 03/28/2018    A1C 8.0 09/20/2017       Dementia without behavioral disturbance, unspecified dementia type  Taking Remeron 22.5mg po at bedtime. Had some functional and cognitive decline in the past year, but this has stabilized now. Focus is comfort care. No behavioral issues. Occasionally has a fall when attempting self transfer.    Benign essential hypertension  BP readings range:  118/75  120/68  133/72  119/71  127/71  121/80  111/73  127/79  127/68  123/75  109/60  135/79  105/64  119/74      ALLERGIES: Sulfa drugs and Bactrim [sulfamethoxazole w/trimethoprim]  Past Medical, Surgical, Family and Social History reviewed and updated in EPIC.    Current Outpatient Prescriptions   Medication Sig Dispense Refill     Acetaminophen (TYLENOL PO) Take 1,000 mg by mouth 3 times daily       Furosemide (LASIX PO) Take 40 mg by mouth 2 times daily        insulin lispro (HUMALOG) 100 UNIT/ML injection 5 units with breakfast, 3 units with dinner. No  "insulin with lunch       ipratropium - albuterol 0.5 mg/2.5 mg/3 mL (DUONEB) 0.5-2.5 (3) MG/3ML neb solution Take 1 vial by nebulization 2 times daily       ipratropium - albuterol 0.5 mg/2.5 mg/3 mL (DUONEB) 0.5-2.5 (3) MG/3ML nebulization Take 1 vial by nebulization 4 times daily as needed for shortness of breath / dyspnea or wheezing        METFORMIN HCL PO Take 1,000 mg by mouth 2 times daily (with meals)        Mirtazapine (REMERON PO) Take 22.5 mg by mouth At Bedtime        nystatin (MYCOSTATIN) cream Apply to left abdomen topically every morning and at bedtime for redness apply until healed and then change to everyday PRN       polyvinyl alcohol (LIQUIFILM TEARS) 1.4 % ophthalmic solution Place 1 drop into both eyes 2 times daily       POTASSIUM CHLORIDE PO Take 20 mEq by mouth 2 times daily        Skin Protectants, Misc. (HYDROCERIN) LOTN Apply to right lower leg topically as needed for dry skin apply to right leg once a day       sodium chloride (OCEAN) 0.65 % nasal spray Spray 2 sprays into both nostrils 2 times daily       Medications reconciled to facility chart and changes were made to reflect current medications as identified as above med list.     REVIEW OF SYSTEMS:  Reliability questionable secondary to cognitive impairment. Denies chest pain, shortness of breath, fevers, chills, headache, nausea, vomiting, dysuria or bowel abnormalities. No pain.    Physical Exam:  /67  Pulse 63  Temp 97.4  F (36.3  C)  Resp 16  Ht 5' 8.3\" (1.735 m)  Wt 173 lb (78.5 kg)  SpO2 95%  BMI 26.07 kg/m2   GENERAL APPEARANCE:  Dozing in chair, arouses easily to voice, no apparent distress  ENT:  Mouth and posterior oropharynx normal, moist mucous membranes, [place]  EYES:  EOM, conjunctivae, lids, pupils and irises normal  NECK:  No adenopathy,masses or thyromegaly  RESP:  respiratory effort and palpation of chest normal, lungs clear to auscultation , no respiratory distress  CV:  Palpation and auscultation of " heart done , regular rate and rhythm, no murmur, rub, or gallop, 2+ edema in RLE, 1+ LLE  ABDOMEN:  normal bowel sounds, soft, nontender, no hepatosplenomegaly or other masses  SKIN: skin is thin, dry, and papery  PSYCH:  insight and judgement impaired, memory impaired , affect abnormal flat      Recent Labs:   CBC RESULTS:   Recent Labs   Lab Test 03/28/18 09/20/17   WBC  7.4  7.4   RBC  4.21*  3.72*   HGB  12.5*  11.4*   HCT  38.8*  35.4*   MCV  92  95   MCH  29.7  30.6   MCHC  32.2  32.2   RDW  15.1*  13.5   PLT  268  268       Last Basic Metabolic Panel:  Recent Labs   Lab Test 10/15/18 09/26/18   NA  140  141   POTASSIUM  4.3  4.0   CHLORIDE  105  104   ELEANOR  9.1  9.4   CO2  28  30   BUN  28  26   CR  1.16  1.05   GLC  155*  129*       Liver Function Studies -   Recent Labs   Lab Test 07/13/16 02/09/14   1955   PROTTOTAL  6.4  4.9*   ALBUMIN  3.2  2.3*   BILITOTAL  0.4  0.4   ALKPHOS  106  100   AST  18  30   ALT  20  46       Lab Results   Component Value Date    A1C 7.3 10/15/2018    A1C 7.1 03/28/2018           Assessment/Plan:  (R60.0) Bilateral lower extremity edema (primary encounter diagnosis)  Comment: Improved with increased lasix, weight up today, but this may be related to something he ate. Will continue with increased dose for now as his edema had gotten significant. Will reorder BMP for tomorrow. If any sign of dehydration on labs, will decrease lasix again  Plan: Cont lasix 40mg BID. BMP 11/1/18. Monitor weight, edema    (E11.65) Type 2 diabetes mellitus with hyperglycemia, unspecified long term insulin use status (H)   Comment: BG fair control without hypoglycemia. HgA1c <8% not recommended in elderly due to risk of hypoglycemia. Risk outweighs benefit of tighter control  Plan: Continue current POC with no changes at this time. Adjust medication as clinically indicated.    (F03.90) Dementia without behavioral disturbance, unspecified dementia type  Comment: Chronic, progressive. Needs 24 hour  skilled nursing care. HPI/ROS difficult to obtain with cognitive impairment. Expect further functional and cognitive decline. Expect weight loss.  Plan: Continue 24 hour care. Monitor weight. Monitor functional status. Monitor for behavioral disturbances.    (I10) Benign essential hypertension  Comment: On lasix only. BP controlled, no hypotension  Plan: Continue current POC with no changes at this time and adjustments as needed.      Orders:  CHARLES 11/1/18      Electronically signed by  DRE [facility]  Cell: [phone]

## 2018-11-04 NOTE — PROGRESS NOTES
[place] [facility]  Nursing Home Regulatory Visit  November 5, 2018    Chief Complaint   Patient presents with     half-way Regulatory       HPI:    Titus Seay is a 89 year old  (10/27/1927), who is being seen today for a federally mandated E/M visit at [facility]. Today's concerns are:    1) Bilateral LE Edema -- Managed with furosemide 40 mg BID. Weights stable in 170-173 lb range, but that is up 10 lbs from prior to June. Really no LE edema on exam today.   2) DM, Type II Hgb A1c 7 in June. Sugars 150s-210 with vast majority <200. Managed with metformin 1000 mg BID and lispro 5 units with breakfast and 3 units with dinner (none with lunch).   3) Myasthenia Gravis -- By history. Was previously followed by Dr. Peña with neurology. Has been tapered off prednisone without any signs of exacerbation.    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 2012      URI (upper respiratory infection)     sore throat, cough, hoarse - started last night - worse today      Wenckebach's incomplete AV block        PAST SURGICAL HISTORY:   Past Surgical History:   Procedure Laterality Date     C REMOVE EYE W IMPLANT  1995     right eye     C TOTAL KNEE ARTHROPLASTY  2010     COLONOSCOPY       DRAIN SKIN ABSCESS SIMPLE  2007    right foot     EXCISE MASS HEAD  11/15/2012    Procedure: EXCISE MASS HEAD;  EXCISION OF SCALP MASSES TIMES TWO;  Surgeon: Stevan Gonsales MD;  Location: [facility] OR     FUSION CERVICAL POSTERIOR ONE LEVEL  10/14/2013    Procedure: FUSION CERVICAL POSTERIOR ONE LEVEL;  Cervical 1 - 2 Posterior Cervical Fusions with neuro monitoring, and Dorsal Slit of Foreskin;  Surgeon: Miah Hutson MD;  Location: [facility] OR       FAMILY HISTORY:   Family History   Problem Relation Age of Onset     Alzheimer Disease Father        at age 79 of Pneumonia     Cancer - colorectal Mother       at age 79     Breast Cancer Sister       at age 44      Diabetes Mother      Diabetes Brother       at age 70      Circulatory Brother       of an anurysm at age 70     Diabetes Sister      C.A.D. Brother      He  suddenly     Anesthesia Reaction No family hx of      Osteoporosis No family hx of      Thyroid Disease No family hx of        SOCIAL HISTORY:   Lives in a SNF    MEDICATIONS:  Current Outpatient Prescriptions   Medication Sig Dispense Refill     Acetaminophen (TYLENOL PO) Take 1,000 mg by mouth 3 times daily       Furosemide (LASIX PO) Take 40 mg by mouth 2 times daily        insulin lispro (HUMALOG) 100 UNIT/ML injection 5 units with breakfast, 3 units with dinner. No insulin with lunch       ipratropium - albuterol 0.5 mg/2.5 mg/3 mL (DUONEB) 0.5-2.5 (3) MG/3ML neb solution Take 1 vial by nebulization 2 times daily       ipratropium - albuterol 0.5 mg/2.5 mg/3 mL (DUONEB) 0.5-2.5 (3) MG/3ML nebulization Take 1 vial by nebulization 4 times daily as needed for shortness of breath / dyspnea or wheezing        METFORMIN HCL PO Take 1,000 mg by mouth 2 times daily (with meals)        Mirtazapine (REMERON PO) Take 22.5 mg by mouth At Bedtime        nystatin (MYCOSTATIN) cream Apply to left abdomen topically  "every morning and at bedtime for redness apply until healed and then change to everyday PRN       polyvinyl alcohol (LIQUIFILM TEARS) 1.4 % ophthalmic solution Place 1 drop into both eyes 2 times daily       POTASSIUM CHLORIDE PO Take 20 mEq by mouth 2 times daily        Skin Protectants, Misc. (HYDROCERIN) LOTN Apply to right lower leg topically as needed for dry skin apply to right leg once a day       sodium chloride (OCEAN) 0.65 % nasal spray Spray 2 sprays into both nostrils 2 times daily         Medications reviewed:  Medications reconciled to facility chart and changes were made to reflect current medications as identified as above med list. Below are the changes that were made:   Medications stopped since last EPIC medication reconciliation:   There are no discontinued medications.  Medications started since last [facility] medication reconciliation:  No orders of the defined types were placed in this encounter.      Case Management:  I have reviewed the care plan and MDS and do agree with the plan.   Information reviewed:  Medications, vital signs, orders, and nursing notes.    ROS:  Unable to be obtained due to cognitive impairment or aphasia.     PHYSICAL EXAM:  /67  Pulse 63  Temp 97.4  F (36.3  C)  Resp 16  Ht 5' 8\" (1.727 m)  Wt 173 lb (78.5 kg)  SpO2 95%  BMI 26.3 kg/m2  Gen: sitting in wheelchair, alert, cooperative and in no acute distress  Resp: breathing non-labored  GI: abdomen soft, not-tender  Ext: no/trace LE edema on exam today  Neuro: CX II-XII grossly in tact; ROM in all four extremities grossly in tact  Psych: memory, judgement and insight impaired  Skin: bilateral LE with leticia, chronic venous stasis changes     Lab/Diagnostic data:  Reviewed as per Epic    ASSESSMENT/PLAN    1) Bilateral LE Edema   Managed with furosemide 40 mg BID. Weights stable in 170-173 lb range, but that is up 10 lbs from prior to June. Renal function stable. Really no LE edema on   -- continues on " furosemide 40 mg BID  -- follow clinically    2) DM, Type II   Hgb A1c 7 in June. Sugars 150s-210 with vast majority <200.   -- continues on metformin 1000 mg BID and lispro 5 units with breakfast and 3 units with dinner (none with lunch)  -- follow sugars and adjust medications as needed    3) Myasthenia Gravis   By history. Was previously followed by Dr. Peña with neurology. Has been tapered off prednisone without any signs of exacerbation.  -- follow clinically     Electronically signed by:  Miya Crisostomo MD

## 2018-11-05 NOTE — LETTER
11/5/2018        RE: Titus Seay  [address] [facility]  [place] [address]        [facility]  Nursing Home Regulatory Visit  November 5, 2018    Chief Complaint   Patient presents with     FCI Regulatory       HPI:    Titus Seay is a 89 year old  (10/27/1927), who is being seen today for a federally mandated E/M visit at [facility]. Today's concerns are:    1) Bilateral LE Edema -- Managed with furosemide 40 mg BID. Weights stable in 170-173 lb range, but that is up 10 lbs from prior to June. Really no LE edema on exam today.   2) DM, Type II Hgb A1c 7 in June. Sugars 150s-210 with vast majority <200. Managed with metformin 1000 mg BID and lispro 5 units with breakfast and 3 units with dinner (none with lunch).   3) Myasthenia Gravis -- By history. Was previously followed by Dr. Peña with neurology. Has been tapered off prednisone without any signs of exacerbation.    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 2012      URI (upper respiratory infection)     sore throat, cough, hoarse - started last night - worse today      Wenckebach's incomplete AV block        PAST SURGICAL  HISTORY:   Past Surgical History:   Procedure Laterality Date     C REMOVE EYE W IMPLANT      right eye     C TOTAL KNEE ARTHROPLASTY  2010     COLONOSCOPY       DRAIN SKIN ABSCESS SIMPLE  2007    right foot     EXCISE MASS HEAD  11/15/2012    Procedure: EXCISE MASS HEAD;  EXCISION OF SCALP MASSES TIMES TWO;  Surgeon: Stevan Gonsales MD;  Location: [facility]     FUSION CERVICAL POSTERIOR ONE LEVEL  10/14/2013    Procedure: FUSION CERVICAL POSTERIOR ONE LEVEL;  Cervical 1 - 2 Posterior Cervical Fusions with neuro monitoring, and Dorsal Slit of Foreskin;  Surgeon: Miah Hutson MD;  Location: [facility] OR       FAMILY HISTORY:   Family History   Problem Relation Age of Onset     Alzheimer Disease Father        at age 79 of Pneumonia     Cancer - colorectal Mother       at age 79     Breast Cancer Sister       at age 44      Diabetes Mother      Diabetes Brother       at age 70      Circulatory Brother       of an anurysm at age 70     Diabetes Sister      C.A.D. Brother      He  suddenly     Anesthesia Reaction No family hx of      Osteoporosis No family hx of      Thyroid Disease No family hx of        SOCIAL HISTORY:   Lives in a SNF    MEDICATIONS:  Current Outpatient Prescriptions   Medication Sig Dispense Refill     Acetaminophen (TYLENOL PO) Take 1,000 mg by mouth 3 times daily       Furosemide (LASIX PO) Take 40 mg by mouth 2 times daily        insulin lispro (HUMALOG) 100 UNIT/ML injection 5 units with breakfast, 3 units with dinner. No insulin with lunch       ipratropium - albuterol 0.5 mg/2.5 mg/3 mL (DUONEB) 0.5-2.5 (3) MG/3ML neb solution Take 1 vial by nebulization 2 times daily       ipratropium - albuterol 0.5 mg/2.5 mg/3 mL (DUONEB) 0.5-2.5 (3) MG/3ML nebulization Take 1 vial by nebulization 4 times daily as needed for shortness of breath / dyspnea or wheezing        METFORMIN HCL PO Take 1,000 mg by mouth 2 times daily (with meals)        Mirtazapine (REMERON PO) Take  "22.5 mg by mouth At Bedtime        nystatin (MYCOSTATIN) cream Apply to left abdomen topically every morning and at bedtime for redness apply until healed and then change to everyday PRN       polyvinyl alcohol (LIQUIFILM TEARS) 1.4 % ophthalmic solution Place 1 drop into both eyes 2 times daily       POTASSIUM CHLORIDE PO Take 20 mEq by mouth 2 times daily        Skin Protectants, Misc. (HYDROCERIN) LOTN Apply to right lower leg topically as needed for dry skin apply to right leg once a day       sodium chloride (OCEAN) 0.65 % nasal spray Spray 2 sprays into both nostrils 2 times daily         Medications reviewed:  Medications reconciled to facility chart and changes were made to reflect current medications as identified as above med list. Below are the changes that were made:   Medications stopped since last EPIC medication reconciliation:   There are no discontinued medications.  Medications started since last [facility] medication reconciliation:  No orders of the defined types were placed in this encounter.      Case Management:  I have reviewed the care plan and MDS and do agree with the plan.   Information reviewed:  Medications, vital signs, orders, and nursing notes.    ROS:  Unable to be obtained due to cognitive impairment or aphasia.     PHYSICAL EXAM:  /67  Pulse 63  Temp 97.4  F (36.3  C)  Resp 16  Ht 5' 8\" (1.727 m)  Wt 173 lb (78.5 kg)  SpO2 95%  BMI 26.3 kg/m2  Gen: sitting in wheelchair, alert, cooperative and in no acute distress  Resp: breathing non-labored  GI: abdomen soft, not-tender  Ext: no/trace LE edema on exam today  Neuro: CX II-XII grossly in tact; ROM in all four extremities grossly in tact  Psych: memory, judgement and insight impaired  Skin: bilateral LE with leticia, chronic venous stasis changes     Lab/Diagnostic data:  Reviewed as per Epic    ASSESSMENT/PLAN    1) Bilateral LE Edema   Managed with furosemide 40 mg BID. Weights stable in 170-173 lb range, but that is up 10 " lbs from prior to June. Renal function stable. Really no LE edema on   -- continues on furosemide 40 mg BID  -- follow clinically    2) DM, Type II   Hgb A1c 7 in June. Sugars 150s-210 with vast majority <200.   -- continues on metformin 1000 mg BID and lispro 5 units with breakfast and 3 units with dinner (none with lunch)  -- follow sugars and adjust medications as needed    3) Myasthenia Gravis   By history. Was previously followed by Dr. Peña with neurology. Has been tapered off prednisone without any signs of exacerbation.  -- follow clinically     Electronically signed by:  Miya Crisostomo MD      Sincerely,        Miya Crisostomo MD

## 2018-11-12 NOTE — LETTER
"    11/12/2018        RE: Titus Seay  [address] [place] [place] [address]        [facility]    Chief Complaint   Patient presents with     Edema       [facility] Record Number:  [id number]  Place of Service where encounter took place:  [facility] ([facility]) [[id number]]    HPI:    Titus Seay is a 89 year old  (10/27/1927), who is being seen today for an episodic care visit.  HPI information obtained from: facility chart records, facility staff and patient report.     Today's concern is:  Bilateral lower extremity edema  Furosemide dose doubled 9/19/18 due to increased edema, new skin tear. No SOB, hypoxia.  Weights:  11/5    165.5lb  10/30  173lb  10/22  168lb  10/8    170lb  10/1    173.5lb  9/24    170lb        REVIEW OF SYSTEMS:  4 point ROS including Respiratory, CV, GI and , other than that noted in the HPI,  is negative    EXAM:  /77  Pulse 67  Temp 97.4  F (36.3  C)  Resp 18  Ht 5' 8\" (1.727 m)  Wt 165 lb 8 oz (75.1 kg)  SpO2 96%  BMI 25.16 kg/m2  GENERAL APPEARANCE:  Alert, in no distress  CV: trace BLE edema      LAB RESULTS:  11/1/18  Na 140  K 4.2  BUN 25  Creat 1.16      ASSESSMENT/PLAN:  (R60.0) Bilateral lower extremity edema  (primary encounter diagnosis)  Comment: Tolerating increased furosemide with no evidence of ENIO. Edema is much better controlled at this dose.   Plan: Continue current POC with no changes at this time and adjustments as needed.        Electronically signed by:  DRE Nicholson CNP   [facility]  Cell: [phone]          "

## 2018-11-12 NOTE — PROGRESS NOTES
"[facility]    Chief Complaint   Patient presents with     Edema       [facility] Record Number:  [id number]  Place of Service where encounter took place:  [facility] ([facility]) [[id number]]    HPI:    Titus Seay is a 89 year old  (10/27/1927), who is being seen today for an episodic care visit.  HPI information obtained from: facility chart records, facility staff and patient report.     Today's concern is:  Bilateral lower extremity edema  Furosemide dose doubled 9/19/18 due to increased edema, new skin tear. No SOB, hypoxia.  Weights:  11/5    165.5lb  10/30  173lb  10/22  168lb  10/8    170lb  10/1    173.5lb  9/24    170lb        REVIEW OF SYSTEMS:  4 point ROS including Respiratory, CV, GI and , other than that noted in the HPI,  is negative    EXAM:  /77  Pulse 67  Temp 97.4  F (36.3  C)  Resp 18  Ht 5' 8\" (1.727 m)  Wt 165 lb 8 oz (75.1 kg)  SpO2 96%  BMI 25.16 kg/m2  GENERAL APPEARANCE:  Alert, in no distress  CV: trace BLE edema      LAB RESULTS:  11/1/18  Na 140  K 4.2  BUN 25  Creat 1.16      ASSESSMENT/PLAN:  (R60.0) Bilateral lower extremity edema  (primary encounter diagnosis)  Comment: Tolerating increased furosemide with no evidence of ENIO. Edema is much better controlled at this dose.   Plan: Continue current POC with no changes at this time and adjustments as needed.        Electronically signed by:  DRE Nicholson [facility]  Cell: [phone]    "

## 2018-12-19 NOTE — LETTER
12/19/2018        RE: Titus Seay  [address] [place] [address]        [facility]  Chief Complaint   Patient presents with     Annual Comprehensive Nursing Home       [facility] Record Number:  [id number]  Place of Service where encounter took place:  [facility] ([facility]) [[id number]]      HPI:    Titus Seay is a 89 year old  (10/27/1927), who is being seen today for an annual comprehensive visit.  HPI information obtained from: facility chart records, facility staff and patient report.        Today's concerns are:  Type 2 diabetes mellitus with hyperglycemia, unspecified whether long term insulin use (H)  Last BG Levels:    AM: 157, 139, 221, 156, 179, 176, 134, 163, 156, 165, 190, 170, 164     Dinner: 210, 173, 251, 170, 150, 203, 187, 244, 137, 186, 148, 155, 177    Lab Results   Component Value Date    A1C 7.3 10/15/2018    A1C 7.1 03/28/2018       Bilateral lower extremity edema  On lasix, this was double 9/2018 due to worsening edema and a skin tear.   Wt Readings from Last 4 Encounters:   12/19/18 73.5 kg (162 lb)   11/12/18 75.1 kg (165 lb 8 oz)   10/31/18 78.5 kg (173 lb)   10/31/18 78.5 kg (173 lb)       Dementia without behavioral disturbance, unspecified dementia type  Taking Remeron 22.5mg po at bedtime. Had some functional and cognitive decline in the past year, but this has stabilized now. Focus is comfort care. No behavioral issues. Occasionally has a fall when attempting self transfer.    Benign essential hypertension  On lasix only  BP readings range:  106/74  120/70  104/67  110/53  104/67  110/53  95/66  152/63  115/74  115/77  98/66  107/75  101/66  118/65        ALLERGIES: Sulfa drugs and Bactrim [sulfamethoxazole w/trimethoprim]  PROBLEM LIST:  Patient Active Problem List   Diagnosis     Nonproliferative diabetic retinopathy (H)     Diabetic macular edema (H)     Wenckebach's incomplete AV block     Hyperlipidemia LDL goal <100     ACP  (advance care planning)     DJD (degenerative joint disease) of knee     Cervical spine tumor     Status post cervical spinal fusion     Compression of brainstem (H)     Phimosis     Adjustment disorder with depressed mood     Dysphagia     Syncope and collapse     Myasthenia gravis (H)     GI bleed     OA (osteoarthritis) of knee     Generalized muscle weakness     Left knee pain     Lumbago     Skin tear of left lower leg without complication, sequela     Bilateral lower extremity edema     Benign essential hypertension     Dementia without behavioral disturbance, unspecified dementia type     Type 2 diabetes mellitus with hyperglycemia (H)     PAST MEDICAL HISTORY:  has a past medical history of Arthritis, BUNION- right (3/17/2008), Cataract, left, 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 He also has no past medical history of Thyroid disease.  PAST SURGICAL HISTORY:  has a past surgical history that includes drain skin abscess simple (7/2007); REMOVE EYE W IMPLANT (1995); colonoscopy; Excise mass head (11/15/2012); TOTAL KNEE ARTHROPLASTY (8/2010); and Fusion cervical posterior one level (10/14/2013).  FAMILY HISTORY: family history includes Alzheimer Disease in his father; Breast Cancer in his sister; C.A.D. in his brother; Cancer - colorectal in his mother; Circulatory in his brother; Diabetes in his brother, mother, and sister.  SOCIAL HISTORY:  reports that he quit smoking about 35 years ago. His smoking  use included pipe. He quit after 30.00 years of use. he has never used smokeless tobacco. He reports that he does not drink alcohol or use drugs.  IMMUNIZATIONS:  Most Recent Immunizations   Administered Date(s) Administered     Influenza (High Dose) 3 valent vaccine 10/17/2013     Influenza (IIV3) PF 11/12/2018     Pneumo Conj 13-V (2010&after) 09/25/2015     Pneumococcal 23 valent 12/31/2009     TD (ADULT, 7+) 01/05/2009     Above immunizations pulled from [facility]. [facility] and facility records also reconciled. Future immunizations are not needed at this point as all recommended immunizations are up to date.   MEDICATIONS:  Current Outpatient Medications   Medication Sig Dispense Refill     Acetaminophen (TYLENOL PO) Take 1,000 mg by mouth 3 times daily       Furosemide (LASIX PO) Take 40 mg by mouth 2 times daily        insulin lispro (HUMALOG) 100 UNIT/ML injection 5 units with breakfast, 3 units with dinner. No insulin with lunch       ipratropium - albuterol 0.5 mg/2.5 mg/3 mL (DUONEB) 0.5-2.5 (3) MG/3ML neb solution Take 1 vial by nebulization 2 times daily       ipratropium - albuterol 0.5 mg/2.5 mg/3 mL (DUONEB) 0.5-2.5 (3) MG/3ML nebulization Take 1 vial by nebulization 4 times daily as needed for shortness of breath / dyspnea or wheezing        METFORMIN HCL PO Take 1,000 mg by mouth 2 times daily (with meals)        Mirtazapine (REMERON PO) Take 22.5 mg by mouth At Bedtime        nystatin (MYCOSTATIN) cream Apply to left abdomen topically every morning and at bedtime for redness apply until healed and then change to everyday PRN       polyvinyl alcohol (LIQUIFILM TEARS) 1.4 % ophthalmic solution Place 1 drop into both eyes 2 times daily       POTASSIUM CHLORIDE PO Take 20 mEq by mouth 2 times daily        Skin Protectants, Misc. (HYDROCERIN) LOTN Apply to right lower leg topically as needed for dry skin apply to right leg once a day       sodium chloride (OCEAN) 0.65 % nasal spray Spray 2 sprays into  "both nostrils 2 times daily       Medications reviewed:  Medications reconciled to facility chart and changes were made to reflect current medications as identified as above med list.     Case Management:  I have reviewed the facility/SNF care plan/MDS which was done 11/29/18, including the falls risk, nutrition and pain screening. I also reviewed the current immunizations, and preventive care..Future cancer screening is not clinically indicated secondary to age/goals of care Patient's desire to return to the community is not present. Current Level of Care is appropriate.      Advance Directive Discussion:    I reviewed the current advanced directives as reflected in EPIC, the POLST and the facility chart, and verified the congruency of orders. I contacted the first party, daughter Micheline, and left a message regarding the plan of Care.  I did not due to cognitive impairment review the advance directives with the resident.     Team Discussion:  I communicated with the appropriate disciplines involved with the Plan of Care:   Nursing      Patient Goal:  Patient's goal is unobtainable secondary to cognitive impairment and family's/responsible party's goal for the patient is comfort care.    Information reviewed:  Medications, vital signs, orders, and nursing notes.    ROS:  Reliability questionable secondary to cognitive impairment. Denies chest pain, shortness of breath, fevers, chills, headache, nausea, vomiting, dysuria or bowel abnormalities.  Appetite is good.    Exam:  /74   Pulse 61   Temp 98.2  F (36.8  C)   Resp 16   Ht 1.735 m (5' 8.3\")   Wt 73.5 kg (162 lb)   SpO2 98%   BMI 24.42 kg/m     GENERAL APPEARANCE:  Dozing in chair, arouses easily to voice, no apparent distress  ENT:  Mouth and posterior oropharynx normal, moist mucous membranes, [place]  EYES:  EOM, conjunctivae, lids, pupils and irises normal  NECK:  No adenopathy,masses or thyromegaly  RESP:  respiratory effort and palpation of chest " normal, lungs clear to auscultation , no respiratory distress  CV:  Palpation and auscultation of heart done , regular rate and rhythm, no murmur, rub, or gallop, 1+ edema in RLE, trace LLE  ABDOMEN:  normal bowel sounds, soft, nontender, no hepatosplenomegaly or other masses  SKIN: skin is thin, dry, and papery  PSYCH:  insight and judgement impaired, memory impaired , affect abnormal flat      Lab/Diagnostic data:    11/1/18  Na 140  K 4.2  BUN 25  Creat 1.16    CBC RESULTS:   Recent Labs   Lab Test 03/28/18 09/20/17   WBC 7.4 7.4   RBC 4.21* 3.72*   HGB 12.5* 11.4*   HCT 38.8* 35.4*   MCV 92 95   MCH 29.7 30.6   MCHC 32.2 32.2   RDW 15.1* 13.5    268       Last Basic Metabolic Panel:  Recent Labs   Lab Test 10/15/18 09/26/18    141   POTASSIUM 4.3 4.0   CHLORIDE 105 104   ELEANOR 9.1 9.4   CO2 28 30   BUN 28 26   CR 1.16 1.05   * 129*       Liver Function Studies -   Recent Labs   Lab Test 07/13/16 02/09/14  1955   PROTTOTAL 6.4 4.9*   ALBUMIN 3.2 2.3*   BILITOTAL 0.4 0.4   ALKPHOS 106 100   AST 18 30   ALT 20 46       Lab Results   Component Value Date    A1C 7.3 10/15/2018    A1C 7.1 03/28/2018         ASSESSMENT/PLAN  (E11.65) Type 2 diabetes mellitus with hyperglycemia, unspecified whether long term insulin use (H)  (primary encounter diagnosis)  Comment: Controlled. HgA1c <8% not recommended in elderly due to risk of hypoglycemia. Risk outweighs benefit of tighter control.  Plan: Continue current POC with no changes at this time and adjustments as needed.    (R60.0) Bilateral lower extremity edema  Comment: Controlled with current lasix dose and no s/sx dehydration.   Plan: Continue current POC with no changes at this time and adjustments as needed.    (F03.90) Dementia without behavioral disturbance, unspecified dementia type  Comment: Chronic, progressive. Needs 24 hour skilled nursing care. HPI/ROS difficult to obtain with cognitive impairment. Expect further functional and cognitive  decline. Expect weight loss.  Plan: Continue 24 hour care. Monitor weight. Monitor functional status. Monitor for behavioral disturbances.    (I10) Benign essential hypertension  Comment: Some lower readings, but does not appear symptomatic. Current lasix dose is appropriate given significant improvement in edema. BP goals are <150/90 mm Hg.This is higher than ACC and AHA recommendations due to goals of care, risk for hypotension, risk of dizziness and falls, risk of tissue/cerebral hypoperfusion and Nixon et al (2018) found that sBPs greater than 170 had improved mortality and improved cognitive retention over sBPs under 140 in patients 85 years and older.  Plan: Continue current POC with no changes at this time and adjustments as needed.      Electronically signed by:  DRE Nicholson [facility]  Cell: [phone]

## 2018-12-19 NOTE — PROGRESS NOTES
[facility]  Chief Complaint   Patient presents with     Annual Comprehensive Nursing Home       [place] Medical Record Number:  [id number]  Place of Service where encounter took place:  [facility] ([facility]) [[id number]]      HPI:    Titus Seay is a 89 year old  (10/27/1927), who is being seen today for an annual comprehensive visit.  HPI information obtained from: facility chart records, facility staff and patient report.        Today's concerns are:  Type 2 diabetes mellitus with hyperglycemia, unspecified whether long term insulin use (H)  Last BG Levels:    AM: 157, 139, 221, 156, 179, 176, 134, 163, 156, 165, 190, 170, 164     Dinner: 210, 173, 251, 170, 150, 203, 187, 244, 137, 186, 148, 155, 177    Lab Results   Component Value Date    A1C 7.3 10/15/2018    A1C 7.1 03/28/2018       Bilateral lower extremity edema  On lasix, this was double 9/2018 due to worsening edema and a skin tear.   Wt Readings from Last 4 Encounters:   12/19/18 73.5 kg (162 lb)   11/12/18 75.1 kg (165 lb 8 oz)   10/31/18 78.5 kg (173 lb)   10/31/18 78.5 kg (173 lb)       Dementia without behavioral disturbance, unspecified dementia type  Taking Remeron 22.5mg po at bedtime. Had some functional and cognitive decline in the past year, but this has stabilized now. Focus is comfort care. No behavioral issues. Occasionally has a fall when attempting self transfer.    Benign essential hypertension  On lasix only  BP readings range:  106/74  120/70  104/67  110/53  104/67  110/53  95/66  152/63  115/74  115/77  98/66  107/75  101/66  118/65        ALLERGIES: Sulfa drugs and Bactrim [sulfamethoxazole w/trimethoprim]  PROBLEM LIST:  Patient Active Problem List   Diagnosis     Nonproliferative diabetic retinopathy (H)     Diabetic macular edema (H)     Wenckebach's incomplete AV block     Hyperlipidemia LDL goal <100     ACP (advance care planning)     DJD (degenerative joint disease) of knee     Cervical spine tumor      Status post cervical spinal fusion     Compression of brainstem (H)     Phimosis     Adjustment disorder with depressed mood     Dysphagia     Syncope and collapse     Myasthenia gravis (H)     GI bleed     OA (osteoarthritis) of knee     Generalized muscle weakness     Left knee pain     Lumbago     Skin tear of left lower leg without complication, sequela     Bilateral lower extremity edema     Benign essential hypertension     Dementia without behavioral disturbance, unspecified dementia type     Type 2 diabetes mellitus with hyperglycemia (H)     PAST MEDICAL HISTORY:  has a past medical history of Arthritis, BUNION- right (3/17/2008), Cataract, left, 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 He also has no past medical history of Thyroid disease.  PAST SURGICAL HISTORY:  has a past surgical history that includes drain skin abscess simple (7/2007); REMOVE EYE W IMPLANT (1995); colonoscopy; Excise mass head (11/15/2012); TOTAL KNEE ARTHROPLASTY (8/2010); and Fusion cervical posterior one level (10/14/2013).  FAMILY HISTORY: family history includes Alzheimer Disease in his father; Breast Cancer in his sister; C.A.D. in his brother; Cancer - colorectal in his mother; Circulatory in his brother; Diabetes in his brother, mother, and sister.  SOCIAL HISTORY:  reports that he quit smoking about 35 years ago. His smoking use included pipe. He quit after 30.00 years of use. he has never used smokeless tobacco. He  reports that he does not drink alcohol or use drugs.  IMMUNIZATIONS:  Most Recent Immunizations   Administered Date(s) Administered     Influenza (High Dose) 3 valent vaccine 10/17/2013     Influenza (IIV3) PF 11/12/2018     Pneumo Conj 13-V (2010&after) 09/25/2015     Pneumococcal 23 valent 12/31/2009     TD (ADULT, 7+) 01/05/2009     Above immunizations pulled from [facility]. [facility] and facility records also reconciled. Future immunizations are not needed at this point as all recommended immunizations are up to date.   MEDICATIONS:  Current Outpatient Medications   Medication Sig Dispense Refill     Acetaminophen (TYLENOL PO) Take 1,000 mg by mouth 3 times daily       Furosemide (LASIX PO) Take 40 mg by mouth 2 times daily        insulin lispro (HUMALOG) 100 UNIT/ML injection 5 units with breakfast, 3 units with dinner. No insulin with lunch       ipratropium - albuterol 0.5 mg/2.5 mg/3 mL (DUONEB) 0.5-2.5 (3) MG/3ML neb solution Take 1 vial by nebulization 2 times daily       ipratropium - albuterol 0.5 mg/2.5 mg/3 mL (DUONEB) 0.5-2.5 (3) MG/3ML nebulization Take 1 vial by nebulization 4 times daily as needed for shortness of breath / dyspnea or wheezing        METFORMIN HCL PO Take 1,000 mg by mouth 2 times daily (with meals)        Mirtazapine (REMERON PO) Take 22.5 mg by mouth At Bedtime        nystatin (MYCOSTATIN) cream Apply to left abdomen topically every morning and at bedtime for redness apply until healed and then change to everyday PRN       polyvinyl alcohol (LIQUIFILM TEARS) 1.4 % ophthalmic solution Place 1 drop into both eyes 2 times daily       POTASSIUM CHLORIDE PO Take 20 mEq by mouth 2 times daily        Skin Protectants, Misc. (HYDROCERIN) LOTN Apply to right lower leg topically as needed for dry skin apply to right leg once a day       sodium chloride (OCEAN) 0.65 % nasal spray Spray 2 sprays into both nostrils 2 times daily       Medications reviewed:  Medications reconciled to facility  "chart and changes were made to reflect current medications as identified as above med list.     Case Management:  I have reviewed the facility/SNF care plan/MDS which was done 11/29/18, including the falls risk, nutrition and pain screening. I also reviewed the current immunizations, and preventive care..Future cancer screening is not clinically indicated secondary to age/goals of care Patient's desire to return to the community is not present. Current Level of Care is appropriate.      Advance Directive Discussion:    I reviewed the current advanced directives as reflected in EPIC, the POLST and the facility chart, and verified the congruency of orders. I contacted the first party, daughter Micheline, and left a message regarding the plan of Care.  I did not due to cognitive impairment review the advance directives with the resident.     Team Discussion:  I communicated with the appropriate disciplines involved with the Plan of Care:   Nursing      Patient Goal:  Patient's goal is unobtainable secondary to cognitive impairment and family's/responsible party's goal for the patient is comfort care.    Information reviewed:  Medications, vital signs, orders, and nursing notes.    ROS:  Reliability questionable secondary to cognitive impairment. Denies chest pain, shortness of breath, fevers, chills, headache, nausea, vomiting, dysuria or bowel abnormalities.  Appetite is good.    Exam:  /74   Pulse 61   Temp 98.2  F (36.8  C)   Resp 16   Ht 1.735 m (5' 8.3\")   Wt 73.5 kg (162 lb)   SpO2 98%   BMI 24.42 kg/m    GENERAL APPEARANCE:  Dozing in chair, arouses easily to voice, no apparent distress  ENT:  Mouth and posterior oropharynx normal, moist mucous membranes, [place]  EYES:  EOM, conjunctivae, lids, pupils and irises normal  NECK:  No adenopathy,masses or thyromegaly  RESP:  respiratory effort and palpation of chest normal, lungs clear to auscultation , no respiratory distress  CV:  Palpation and auscultation of " heart done , regular rate and rhythm, no murmur, rub, or gallop, 1+ edema in RLE, trace LLE  ABDOMEN:  normal bowel sounds, soft, nontender, no hepatosplenomegaly or other masses  SKIN: skin is thin, dry, and papery  PSYCH:  insight and judgement impaired, memory impaired , affect abnormal flat      Lab/Diagnostic data:    11/1/18  Na 140  K 4.2  BUN 25  Creat 1.16    CBC RESULTS:   Recent Labs   Lab Test 03/28/18 09/20/17   WBC 7.4 7.4   RBC 4.21* 3.72*   HGB 12.5* 11.4*   HCT 38.8* 35.4*   MCV 92 95   MCH 29.7 30.6   MCHC 32.2 32.2   RDW 15.1* 13.5    268       Last Basic Metabolic Panel:  Recent Labs   Lab Test 10/15/18 09/26/18    141   POTASSIUM 4.3 4.0   CHLORIDE 105 104   ELEANOR 9.1 9.4   CO2 28 30   BUN 28 26   CR 1.16 1.05   * 129*       Liver Function Studies -   Recent Labs   Lab Test 07/13/16 02/09/14  1955   PROTTOTAL 6.4 4.9*   ALBUMIN 3.2 2.3*   BILITOTAL 0.4 0.4   ALKPHOS 106 100   AST 18 30   ALT 20 46       Lab Results   Component Value Date    A1C 7.3 10/15/2018    A1C 7.1 03/28/2018         ASSESSMENT/PLAN  (E11.65) Type 2 diabetes mellitus with hyperglycemia, unspecified whether long term insulin use (H)  (primary encounter diagnosis)  Comment: Controlled. HgA1c <8% not recommended in elderly due to risk of hypoglycemia. Risk outweighs benefit of tighter control.  Plan: Continue current POC with no changes at this time and adjustments as needed.    (R60.0) Bilateral lower extremity edema  Comment: Controlled with current lasix dose and no s/sx dehydration.   Plan: Continue current POC with no changes at this time and adjustments as needed.    (F03.90) Dementia without behavioral disturbance, unspecified dementia type  Comment: Chronic, progressive. Needs 24 hour skilled nursing care. HPI/ROS difficult to obtain with cognitive impairment. Expect further functional and cognitive decline. Expect weight loss.  Plan: Continue 24 hour care. Monitor weight. Monitor functional status.  Monitor for behavioral disturbances.    (I10) Benign essential hypertension  Comment: Some lower readings, but does not appear symptomatic. Current lasix dose is appropriate given significant improvement in edema. BP goals are <150/90 mm Hg.This is higher than ACC and AHA recommendations due to goals of care, risk for hypotension, risk of dizziness and falls, risk of tissue/cerebral hypoperfusion and Nixon et al (2018) found that sBPs greater than 170 had improved mortality and improved cognitive retention over sBPs under 140 in patients 85 years and older.  Plan: Continue current POC with no changes at this time and adjustments as needed.      Electronically signed by:  DRE Nicholson [facility]  Cell: [phone]

## 2019-01-01 ENCOUNTER — TELEPHONE (OUTPATIENT)
Dept: GERIATRICS | Facility: CLINIC | Age: 84
End: 2019-01-01

## 2019-01-01 ENCOUNTER — NURSING HOME VISIT (OUTPATIENT)
Dept: GERIATRICS | Facility: CLINIC | Age: 84
End: 2019-01-01
Payer: MEDICARE

## 2019-01-01 ENCOUNTER — MEDICAL CORRESPONDENCE (OUTPATIENT)
Dept: HEALTH INFORMATION MANAGEMENT | Facility: CLINIC | Age: 84
End: 2019-01-01

## 2019-01-01 VITALS
HEIGHT: 70 IN | BODY MASS INDEX: 20.76 KG/M2 | SYSTOLIC BLOOD PRESSURE: 106 MMHG | OXYGEN SATURATION: 97 % | RESPIRATION RATE: 18 BRPM | WEIGHT: 145 LBS | DIASTOLIC BLOOD PRESSURE: 65 MMHG | HEART RATE: 55 BPM | TEMPERATURE: 98.4 F

## 2019-01-01 VITALS
BODY MASS INDEX: 24.93 KG/M2 | RESPIRATION RATE: 18 BRPM | WEIGHT: 164.5 LBS | HEIGHT: 68 IN | SYSTOLIC BLOOD PRESSURE: 113 MMHG | HEART RATE: 65 BPM | OXYGEN SATURATION: 95 % | TEMPERATURE: 97.4 F | DIASTOLIC BLOOD PRESSURE: 67 MMHG

## 2019-01-01 VITALS
WEIGHT: 157.4 LBS | OXYGEN SATURATION: 100 % | RESPIRATION RATE: 18 BRPM | HEART RATE: 81 BPM | HEIGHT: 68 IN | TEMPERATURE: 98.2 F | SYSTOLIC BLOOD PRESSURE: 116 MMHG | DIASTOLIC BLOOD PRESSURE: 83 MMHG | BODY MASS INDEX: 23.86 KG/M2

## 2019-01-01 DIAGNOSIS — F03.90 DEMENTIA WITHOUT BEHAVIORAL DISTURBANCE, UNSPECIFIED DEMENTIA TYPE: ICD-10-CM

## 2019-01-01 DIAGNOSIS — R60.0 BILATERAL LOWER EXTREMITY EDEMA: ICD-10-CM

## 2019-01-01 DIAGNOSIS — E11.65 TYPE 2 DIABETES MELLITUS WITH HYPERGLYCEMIA, UNSPECIFIED WHETHER LONG TERM INSULIN USE (H): Primary | ICD-10-CM

## 2019-01-01 DIAGNOSIS — I10 BENIGN ESSENTIAL HYPERTENSION: ICD-10-CM

## 2019-01-01 DIAGNOSIS — R40.0 SOMNOLENCE: Primary | ICD-10-CM

## 2019-01-01 DIAGNOSIS — G70.00 MYASTHENIA GRAVIS (H): ICD-10-CM

## 2019-01-01 DIAGNOSIS — G70.00 MYASTHENIA GRAVIS WITHOUT EXACERBATION (H): ICD-10-CM

## 2019-01-01 DIAGNOSIS — E11.8 TYPE 2 DIABETES MELLITUS WITH COMPLICATION, WITH LONG-TERM CURRENT USE OF INSULIN (H): ICD-10-CM

## 2019-01-01 DIAGNOSIS — I10 ESSENTIAL HYPERTENSION: Primary | ICD-10-CM

## 2019-01-01 DIAGNOSIS — Z79.4 TYPE 2 DIABETES MELLITUS WITH HYPERGLYCEMIA, WITH LONG-TERM CURRENT USE OF INSULIN (H): ICD-10-CM

## 2019-01-01 DIAGNOSIS — Z79.4 TYPE 2 DIABETES MELLITUS WITH COMPLICATION, WITH LONG-TERM CURRENT USE OF INSULIN (H): ICD-10-CM

## 2019-01-01 DIAGNOSIS — E11.65 TYPE 2 DIABETES MELLITUS WITH HYPERGLYCEMIA, WITH LONG-TERM CURRENT USE OF INSULIN (H): ICD-10-CM

## 2019-01-01 PROCEDURE — 99309 SBSQ NF CARE MODERATE MDM 30: CPT | Performed by: NURSE PRACTITIONER

## 2019-01-01 PROCEDURE — 99309 SBSQ NF CARE MODERATE MDM 30: CPT | Performed by: INTERNAL MEDICINE

## 2019-01-01 ASSESSMENT — MIFFLIN-ST. JEOR
SCORE: 1348.22
SCORE: 1380.43
SCORE: 1318.97

## 2019-01-09 NOTE — LETTER
1/9/2019        RE: Titus Seay  [address] [place] [address]          [facility]    Chief Complaint   Patient presents with     RECHECK       [place] Medical Record Number:  [id number]  Place of Service where encounter took place:  [facility] ([facility]) [[id number]]    HPI:    Titus Seay is a 89 year old  (10/27/1927), who is being seen today for a federally mandated E/M visit.      HPI information obtained from: facility chart records, facility staff, patient report and [place] Epic chart review.     Today's concerns are:  Type 2 diabetes mellitus with hyperglycemia, unspecified whether long term insulin use (H)  Lunch dose of insulin stopped a few months ago. Currently on insulin with breakfast and dinner.  Last BG Levels:    AM: 228, 188, 189, 183, 165, 205, 215, 178, 182, 178, 170    Dinner: 257, 240, 197, 195, 164, 202, 188, 169, 175, 168, 177    Lab Results   Component Value Date    A1C 7.3 10/15/2018    A1C 7.1 03/28/2018     Dementia without behavioral disturbance, unspecified dementia type  Taking Remeron 22.5mg po at bedtime. Had some functional and cognitive decline in the past year, but this has stabilized now. Focus is comfort care. No behavioral issues. Occasionally has a fall when attempting self transfer. Frequently seen sleeping in wheelchair  Wt Readings from Last 4 Encounters:   01/09/19 74.6 kg (164 lb 8 oz)   12/19/18 73.5 kg (162 lb)   11/12/18 75.1 kg (165 lb 8 oz)   10/31/18 78.5 kg (173 lb)     Benign essential hypertension  BP readings range:  113/67  120/72  123/77  124/77  120/64  134/83  112/75  106/74  120/70  104/67  110/53      Bilateral lower extremity edema  On lasix, this was double 9/2018 due to worsening edema. Wears TG shape compression socks    Myasthenia gravis without exacerbation (H)  Prednisone was weaned off several months ago due to frequent skin tears with slow healing and cellulitis. 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 He also has no past medical history of Thyroid disease.  PAST SURGICAL HISTORY:  has a past surgical history that includes drain skin abscess simple (7/2007); REMOVE EYE W IMPLANT (1995); colonoscopy; Excise mass head (11/15/2012); TOTAL KNEE ARTHROPLASTY (8/2010); and Fusion cervical posterior one level (10/14/2013).  FAMILY HISTORY: family history includes Alzheimer Disease in his father; Breast Cancer in his sister; C.A.D. in his brother; Cancer - colorectal in his mother; Circulatory in his brother; Diabetes in his brother, mother, and sister.  SOCIAL HISTORY:  reports that he quit smoking about 35 years ago. His smoking use included pipe. He quit after 30.00 years of use. he has never used smokeless tobacco. He reports that he does not drink alcohol or use drugs.    MEDICATIONS:  Current Outpatient Medications   Medication Sig Dispense Refill     Acetaminophen (TYLENOL PO) Take 1,000 mg by mouth 3 times daily       Furosemide (LASIX PO) Take 40 mg by mouth 2 times daily        insulin lispro (HUMALOG) 100 UNIT/ML injection 5 units with breakfast, 3 units with dinner. No insulin with lunch       ipratropium - albuterol 0.5 mg/2.5 mg/3 mL (DUONEB) 0.5-2.5 (3) MG/3ML neb solution Take 1 vial by nebulization  "2 times daily       ipratropium - albuterol 0.5 mg/2.5 mg/3 mL (DUONEB) 0.5-2.5 (3) MG/3ML nebulization Take 1 vial by nebulization 4 times daily as needed for shortness of breath / dyspnea or wheezing        METFORMIN HCL PO Take 1,000 mg by mouth 2 times daily (with meals)        Mirtazapine (REMERON PO) Take 22.5 mg by mouth At Bedtime        nystatin (MYCOSTATIN) cream Apply to left abdomen topically every morning and at bedtime for redness apply until healed and then change to everyday PRN       polyvinyl alcohol (LIQUIFILM TEARS) 1.4 % ophthalmic solution Place 1 drop into both eyes 2 times daily       POTASSIUM CHLORIDE PO Take 20 mEq by mouth 2 times daily        Skin Protectants, Misc. (HYDROCERIN) LOTN Apply to right lower leg topically as needed for dry skin apply to right leg once a day       sodium chloride (OCEAN) 0.65 % nasal spray Spray 2 sprays into both nostrils 2 times daily       Medications reviewed:  Medications reconciled to facility chart and changes were made to reflect current medications as identified as above med list.     Case Management:  I have reviewed the care plan and MDS and do agree with the plan. Patient's desire to return to the community is not present.  Information reviewed:  Medications, vital signs, orders, and nursing notes.    ROS:  10 point ROS of systems including Constitutional, Eyes, Respiratory, Cardiovascular, Gastroenterology, Genitourinary, Integumentary, Musculoskeletal, Psychiatric were all negative except for pertinent positives noted in my HPI.    Exam:  Vitals: /67   Pulse 65   Temp 97.4  F (36.3  C)   Resp 18   Ht 1.735 m (5' 8.3\")   Wt 74.6 kg (164 lb 8 oz)   SpO2 95%   BMI 24.79 kg/m     BMI= Body mass index is 24.79 kg/m .   GENERAL APPEARANCE:  Dozing in chair, arouses easily to voice, no apparent distress  ENT:  Mouth and posterior oropharynx normal, moist mucous membranes, [place]  EYES:  EOM, conjunctivae, lids, pupils and irises normal  NECK: "  No adenopathy,masses or thyromegaly  RESP:  respiratory effort and palpation of chest normal, lungs clear to auscultation , no respiratory distress  CV:  Palpation and auscultation of heart done , regular rate and rhythm, no murmur, rub, or gallop, 2+ edema in RLE, 1+ LLE  ABDOMEN:  normal bowel sounds, soft, nontender, no hepatosplenomegaly or other masses  SKIN: skin is thin, dry, and papery  PSYCH:  insight and judgement impaired, memory impaired , affect abnormal flat      Lab/Diagnostic data:     CBC RESULTS:   Recent Labs   Lab Test 03/28/18 09/20/17   WBC 7.4 7.4   RBC 4.21* 3.72*   HGB 12.5* 11.4*   HCT 38.8* 35.4*   MCV 92 95   MCH 29.7 30.6   MCHC 32.2 32.2   RDW 15.1* 13.5    268       Last Basic Metabolic Panel:  Recent Labs   Lab Test 10/15/18 09/26/18    141   POTASSIUM 4.3 4.0   CHLORIDE 105 104   ELEANOR 9.1 9.4   CO2 28 30   BUN 28 26   CR 1.16 1.05   * 129*       Liver Function Studies -   Recent Labs   Lab Test 07/13/16 02/09/14  1955   PROTTOTAL 6.4 4.9*   ALBUMIN 3.2 2.3*   BILITOTAL 0.4 0.4   ALKPHOS 106 100   AST 18 30   ALT 20 46         Lab Results   Component Value Date    A1C 7.3 10/15/2018    A1C 7.1 03/28/2018       ASSESSMENT/PLAN  (E11.65) Type 2 diabetes mellitus with hyperglycemia, unspecified whether long term insulin use (H)  (primary encounter diagnosis)  Comment: Adequately controlled. HgA1c <8% not recommended in elderly due to risk of hypoglycemia. Risk outweighs benefit of tighter control.  Plan: Recheck A1c this month. Adjust medication as clinically indicated.    (F03.90) Dementia without behavioral disturbance, unspecified dementia type  Comment: Chronic, progressive. Needs 24 hour skilled nursing care. HPI/ROS difficult to obtain with cognitive impairment. Expect further functional and cognitive decline. Expect weight loss.  Plan: Continue 24 hour care. Monitor weight. Monitor functional status. Monitor for behavioral disturbances.    (I10) Benign  essential hypertension  Comment: Controlled  Plan: Continue current POC with no changes at this time and adjustments as needed.    (R60.0) Bilateral lower extremity edema  Comment: Controlled with current lasix dose and no s/sx dehydration.   Plan: Continue current POC with no changes at this time and adjustments as needed. Recheck BMP this month    (G70.00) Myasthenia gravis without exacerbation (H)  Comment: Patient's decline that happened a few months ago may have been related to weaning off prednisone, but he is now stable. Risks of prednisone outweigh benefits  Plan: Continue current POC with no changes at this time and adjustments as needed.      Orders:  CBC, BMP, A1c 1/16/19        Electronically signed by:  DRE Nicholson [facility]  Cell: [phone]

## 2019-01-09 NOTE — PROGRESS NOTES
[facility]    Chief Complaint   Patient presents with     CELE       [place] Medical Record Number:  [id number]  Place of Service where encounter took place:  [facility] ([facility]) [[id number]]    HPI:    Titus Seay is a 89 year old  (10/27/1927), who is being seen today for a federally mandated E/M visit.      HPI information obtained from: facility chart records, facility staff, patient report and [facility] chart review.     Today's concerns are:  Type 2 diabetes mellitus with hyperglycemia, unspecified whether long term insulin use (H)  Lunch dose of insulin stopped a few months ago. Currently on insulin with breakfast and dinner.  Last BG Levels:    AM: 228, 188, 189, 183, 165, 205, 215, 178, 182, 178, 170    Dinner: 257, 240, 197, 195, 164, 202, 188, 169, 175, 168, 177    Lab Results   Component Value Date    A1C 7.3 10/15/2018    A1C 7.1 03/28/2018     Dementia without behavioral disturbance, unspecified dementia type  Taking Remeron 22.5mg po at bedtime. Had some functional and cognitive decline in the past year, but this has stabilized now. Focus is comfort care. No behavioral issues. Occasionally has a fall when attempting self transfer. Frequently seen sleeping in wheelchair  Wt Readings from Last 4 Encounters:   01/09/19 74.6 kg (164 lb 8 oz)   12/19/18 73.5 kg (162 lb)   11/12/18 75.1 kg (165 lb 8 oz)   10/31/18 78.5 kg (173 lb)     Benign essential hypertension  BP readings range:  113/67  120/72  123/77  124/77  120/64  134/83  112/75  106/74  120/70  104/67  110/53      Bilateral lower extremity edema  On lasix, this was double 9/2018 due to worsening edema. Wears TG shape compression socks    Myasthenia gravis without exacerbation (H)  Prednisone was weaned off several months ago due to frequent skin tears with slow healing and cellulitis. 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 He also has no past medical history of Thyroid disease.  PAST SURGICAL HISTORY:  has a past surgical history that includes drain skin abscess simple (7/2007); REMOVE EYE W IMPLANT (1995); colonoscopy; Excise mass head (11/15/2012); TOTAL KNEE ARTHROPLASTY (8/2010); and Fusion cervical posterior one level (10/14/2013).  FAMILY HISTORY: family history includes Alzheimer Disease in his father; Breast Cancer in his sister; C.A.D. in his brother; Cancer - colorectal in his mother; Circulatory in his brother; Diabetes in his brother, mother, and sister.  SOCIAL HISTORY:  reports that he quit smoking about 35 years ago. His smoking use included pipe. He quit after 30.00 years of use. he has never used smokeless tobacco. He reports that he does not drink alcohol or use drugs.    MEDICATIONS:  Current Outpatient Medications   Medication Sig Dispense Refill     Acetaminophen (TYLENOL PO) Take 1,000 mg by mouth 3 times daily       Furosemide (LASIX PO) Take 40 mg by mouth 2 times daily        insulin lispro (HUMALOG) 100 UNIT/ML injection 5 units with breakfast, 3 units with dinner. No insulin with lunch       ipratropium - albuterol 0.5 mg/2.5 mg/3 mL (DUONEB) 0.5-2.5 (3) MG/3ML neb solution Take 1 vial by nebulization 2 times daily       ipratropium - albuterol 0.5 mg/2.5 mg/3 mL (DUONEB) 0.5-2.5 (3) MG/3ML  "nebulization Take 1 vial by nebulization 4 times daily as needed for shortness of breath / dyspnea or wheezing        METFORMIN HCL PO Take 1,000 mg by mouth 2 times daily (with meals)        Mirtazapine (REMERON PO) Take 22.5 mg by mouth At Bedtime        nystatin (MYCOSTATIN) cream Apply to left abdomen topically every morning and at bedtime for redness apply until healed and then change to everyday PRN       polyvinyl alcohol (LIQUIFILM TEARS) 1.4 % ophthalmic solution Place 1 drop into both eyes 2 times daily       POTASSIUM CHLORIDE PO Take 20 mEq by mouth 2 times daily        Skin Protectants, Misc. (HYDROCERIN) LOTN Apply to right lower leg topically as needed for dry skin apply to right leg once a day       sodium chloride (OCEAN) 0.65 % nasal spray Spray 2 sprays into both nostrils 2 times daily       Medications reviewed:  Medications reconciled to facility chart and changes were made to reflect current medications as identified as above med list.     Case Management:  I have reviewed the care plan and MDS and do agree with the plan. Patient's desire to return to the community is not present.  Information reviewed:  Medications, vital signs, orders, and nursing notes.    ROS:  10 point ROS of systems including Constitutional, Eyes, Respiratory, Cardiovascular, Gastroenterology, Genitourinary, Integumentary, Musculoskeletal, Psychiatric were all negative except for pertinent positives noted in my HPI.    Exam:  Vitals: /67   Pulse 65   Temp 97.4  F (36.3  C)   Resp 18   Ht 1.735 m (5' 8.3\")   Wt 74.6 kg (164 lb 8 oz)   SpO2 95%   BMI 24.79 kg/m    BMI= Body mass index is 24.79 kg/m .   GENERAL APPEARANCE:  Dozing in chair, arouses easily to voice, no apparent distress  ENT:  Mouth and posterior oropharynx normal, moist mucous membranes, [place]  EYES:  EOM, conjunctivae, lids, pupils and irises normal  NECK:  No adenopathy,masses or thyromegaly  RESP:  respiratory effort and palpation of chest " normal, lungs clear to auscultation , no respiratory distress  CV:  Palpation and auscultation of heart done , regular rate and rhythm, no murmur, rub, or gallop, 2+ edema in RLE, 1+ LLE  ABDOMEN:  normal bowel sounds, soft, nontender, no hepatosplenomegaly or other masses  SKIN: skin is thin, dry, and papery  PSYCH:  insight and judgement impaired, memory impaired , affect abnormal flat      Lab/Diagnostic data:     CBC RESULTS:   Recent Labs   Lab Test 03/28/18 09/20/17   WBC 7.4 7.4   RBC 4.21* 3.72*   HGB 12.5* 11.4*   HCT 38.8* 35.4*   MCV 92 95   MCH 29.7 30.6   MCHC 32.2 32.2   RDW 15.1* 13.5    268       Last Basic Metabolic Panel:  Recent Labs   Lab Test 10/15/18 09/26/18    141   POTASSIUM 4.3 4.0   CHLORIDE 105 104   ELEANOR 9.1 9.4   CO2 28 30   BUN 28 26   CR 1.16 1.05   * 129*       Liver Function Studies -   Recent Labs   Lab Test 07/13/16 02/09/14  1955   PROTTOTAL 6.4 4.9*   ALBUMIN 3.2 2.3*   BILITOTAL 0.4 0.4   ALKPHOS 106 100   AST 18 30   ALT 20 46         Lab Results   Component Value Date    A1C 7.3 10/15/2018    A1C 7.1 03/28/2018       ASSESSMENT/PLAN  (E11.65) Type 2 diabetes mellitus with hyperglycemia, unspecified whether long term insulin use (H)  (primary encounter diagnosis)  Comment: Adequately controlled. HgA1c <8% not recommended in elderly due to risk of hypoglycemia. Risk outweighs benefit of tighter control.  Plan: Recheck A1c this month. Adjust medication as clinically indicated.    (F03.90) Dementia without behavioral disturbance, unspecified dementia type  Comment: Chronic, progressive. Needs 24 hour skilled nursing care. HPI/ROS difficult to obtain with cognitive impairment. Expect further functional and cognitive decline. Expect weight loss.  Plan: Continue 24 hour care. Monitor weight. Monitor functional status. Monitor for behavioral disturbances.    (I10) Benign essential hypertension  Comment: Controlled  Plan: Continue current POC with no changes at this  time and adjustments as needed.    (R60.0) Bilateral lower extremity edema  Comment: Controlled with current lasix dose and no s/sx dehydration.   Plan: Continue current POC with no changes at this time and adjustments as needed. Recheck BMP this month    (G70.00) Myasthenia gravis without exacerbation (H)  Comment: Patient's decline that happened a few months ago may have been related to weaning off prednisone, but he is now stable. Risks of prednisone outweigh benefits  Plan: Continue current POC with no changes at this time and adjustments as needed.      Orders:  CBC, BMP, A1c 1/16/19        Electronically signed by:  DRE Nicholson [facility]  Cell: [phone]

## 2019-02-27 NOTE — PROGRESS NOTES
"[facility]  [place] Medical Record Number:  [id number]  Place of Service where encounter took place:  [facility] ([facility]) [[id number]]  Chief Complaint   Patient presents with     RECHECK     HPI:    Titus Seay  is a 89 year old (10/27/1927), who is being seen today for an episodic care visit.      HPI information obtained from: facility chart records, facility staff and patient report.     Today's concern is:  Type 2 diabetes mellitus with hyperglycemia, unspecified whether long term insulin use (H)  Currently on insulin with breakfast and dinner. Planned on checking A1c last month, per chart review, this was not done  Last BG Levels:  AM: 137, 228, 176, 187, 213, 169, 205  Dinner: 209, 136, 151, 167, 159, 186, 166    Lab Results   Component Value Date    A1C 7.3 10/15/2018    A1C 7.1 03/28/2018     Benign essential hypertension  On lasix only  BP readings range:  116/83  104/69  128/84  105/67  113/67  120/72  123/77  124/77  120/64  134/83  112/75  106/74  120/70  104/67    Bilateral lower extremity edema  On lasix. When he is on smaller doses, he gets worse edema and frequent skin tears. Wears TG shape compression socks        Past Medical and Surgical History reviewed in Epic today.    REVIEW OF SYSTEMS:  Reliability questionable secondary to cognitive impairment. Denies chest pain, shortness of breath, fevers, chills, headache, nausea, vomiting, dysuria or bowel abnormalities.      Physical Exam:  /83   Pulse 81   Temp 98.2  F (36.8  C)   Resp 18   Ht 1.735 m (5' 8.3\")   Wt 71.4 kg (157 lb 6.4 oz)   SpO2 100%   BMI 23.72 kg/m     GENERAL APPEARANCE:  Dozing in chair, arouses easily to voice, no apparent distress  ENT:  Mouth and posterior oropharynx normal, moist mucous membranes, [place]  EYES:  EOM, conjunctivae, lids, pupils and irises normal  NECK:  No adenopathy,masses or thyromegaly  RESP:  respiratory effort and palpation of chest normal, lungs clear to auscultation , " no respiratory distress  CV:  Palpation and auscultation of heart done , regular rate and rhythm, no murmur, rub, or gallop, 2+ edema in RLE, 1+ LLE  ABDOMEN:  normal bowel sounds, soft, nontender, no hepatosplenomegaly or other masses  SKIN: skin is thin, dry, and papery  PSYCH:  insight and judgement impaired, memory impaired , affect abnormal flat      Labs:   Labs done in SNF are in [place] EPIC. Please refer to them using EPIC/Care Everywhere.    ASSESSMENT/PLAN:  (E11.65) Type 2 diabetes mellitus with hyperglycemia, unspecified whether long term insulin use (H)  (primary encounter diagnosis)  Comment: Adequately controlled. HgA1c <8% not recommended in elderly due to risk of hypoglycemia. Risk outweighs benefit of tighter control.  Plan: Recheck A1c 3/4/19, order written in chart. Adjust medication as clinically indicated.    (I10) Benign essential hypertension  Comment: Controlled  Plan: Continue current POC with no changes at this time and adjustments as needed.    (R60.0) Bilateral lower extremity edema  Comment: Controlled with current lasix dose and no s/sx dehydration, but will order BMP.   Plan: Continue current POC with no changes at this time and adjustments as needed. Recheck BMP 3/4/19      Orders:  CBC, BMP, A1c 3/4/19        Electronically signed by:  DRE Nicholson [facility]  Cell: [phone]

## 2019-02-27 NOTE — LETTER
"    2/27/2019        RE: Titus Seay  [address] [place] [address]        [facility]  [place] Medical Record Number:  [id number]  Place of Service where encounter took place:  [facility] ([facility]) [[id number]]  Chief Complaint   Patient presents with     RECHECK     HPI:    Titus Seay  is a 89 year old (10/27/1927), who is being seen today for an episodic care visit.      HPI information obtained from: facility chart records, facility staff and patient report.     Today's concern is:  Type 2 diabetes mellitus with hyperglycemia, unspecified whether long term insulin use (H)  Currently on insulin with breakfast and dinner. Planned on checking A1c last month, per chart review, this was not done  Last BG Levels:  AM: 137, 228, 176, 187, 213, 169, 205  Dinner: 209, 136, 151, 167, 159, 186, 166    Lab Results   Component Value Date    A1C 7.3 10/15/2018    A1C 7.1 03/28/2018     Benign essential hypertension  On lasix only  BP readings range:  116/83  104/69  128/84  105/67  113/67  120/72  123/77  124/77  120/64  134/83  112/75  106/74  120/70  104/67    Bilateral lower extremity edema  On lasix. When he is on smaller doses, he gets worse edema and frequent skin tears. Wears TG shape compression socks        Past Medical and Surgical History reviewed in Epic today.    REVIEW OF SYSTEMS:  Reliability questionable secondary to cognitive impairment. Denies chest pain, shortness of breath, fevers, chills, headache, nausea, vomiting, dysuria or bowel abnormalities.      Physical Exam:  /83   Pulse 81   Temp 98.2  F (36.8  C)   Resp 18   Ht 1.735 m (5' 8.3\")   Wt 71.4 kg (157 lb 6.4 oz)   SpO2 100%   BMI 23.72 kg/m      GENERAL APPEARANCE:  Dozing in chair, arouses easily to voice, no apparent distress  ENT:  Mouth and posterior oropharynx normal, moist mucous membranes, [place]  EYES:  EOM, conjunctivae, lids, pupils and irises normal  NECK:  No adenopathy,masses or " thyromegaly  RESP:  respiratory effort and palpation of chest normal, lungs clear to auscultation , no respiratory distress  CV:  Palpation and auscultation of heart done , regular rate and rhythm, no murmur, rub, or gallop, 2+ edema in RLE, 1+ LLE  ABDOMEN:  normal bowel sounds, soft, nontender, no hepatosplenomegaly or other masses  SKIN: skin is thin, dry, and papery  PSYCH:  insight and judgement impaired, memory impaired , affect abnormal flat      Labs:   Labs done in SNF are in [place] EPIC. Please refer to them using [employer]/Care Everywhere.    ASSESSMENT/PLAN:  (E11.65) Type 2 diabetes mellitus with hyperglycemia, unspecified whether long term insulin use (H)  (primary encounter diagnosis)  Comment: Adequately controlled. HgA1c <8% not recommended in elderly due to risk of hypoglycemia. Risk outweighs benefit of tighter control.  Plan: Recheck A1c 3/4/19, order written in chart. Adjust medication as clinically indicated.    (I10) Benign essential hypertension  Comment: Controlled  Plan: Continue current POC with no changes at this time and adjustments as needed.    (R60.0) Bilateral lower extremity edema  Comment: Controlled with current lasix dose and no s/sx dehydration, but will order BMP.   Plan: Continue current POC with no changes at this time and adjustments as needed. Recheck BMP 3/4/19      Orders:  CBC, BMP, A1c 3/4/19        Electronically signed by:  DRE Nicholson CNP   [facility]  Cell: [phone]

## 2019-03-04 NOTE — LETTER
3/4/2019        RE: Titus Seay  [address] [place] [address]        [facility]  Nursing Home Regulatory Visit  March 4, 2019    Chief Complaint   Patient presents with     FDC Regulatory       HPI:    Titus Seay is a 89 year old  (10/27/1927), who is being seen today for a federally mandated E/M visit at [facility]. Today's concerns are:    1) HTN -- SBPs 110s-120s. He is only on furosemide.   2) DM, Type II -- Hgb A1c 7.3 in October. Sugars 160s-210s. Managed with metformin and lispro.  3) Dementia Without Behavioral Disturbance -- Oriented to self. Chronic and progressive. Comfort focus of cares.     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 2012      URI (upper respiratory infection)     sore throat, cough, hoarse - started last night - worse today      Wenckebach's incomplete AV block        PAST SURGICAL HISTORY:   Past Surgical History:   Procedure Laterality Date     C REMOVE EYE W IMPLANT  1995    right eye     C TOTAL KNEE ARTHROPLASTY  8/2010     COLONOSCOPY       DRAIN SKIN ABSCESS SIMPLE  7/2007    right foot     EXCISE MASS HEAD  11/15/2012    Procedure: EXCISE  MASS HEAD;  EXCISION OF SCALP MASSES TIMES TWO;  Surgeon: Stevan Gonsales MD;  Location: [facility] OR     FUSION CERVICAL POSTERIOR ONE LEVEL  10/14/2013    Procedure: FUSION CERVICAL POSTERIOR ONE LEVEL;  Cervical 1 - 2 Posterior Cervical Fusions with neuro monitoring, and Dorsal Slit of Foreskin;  Surgeon: Miah Hutson MD;  Location: [facility] OR       FAMILY HISTORY:   Family History   Problem Relation Age of Onset     Alzheimer Disease Father           at age 79 of Pneumonia     Cancer - colorectal Mother          at age 79     Breast Cancer Sister          at age 44      Diabetes Mother      Diabetes Brother          at age 70      Circulatory Brother          of an anurysm at age 70     Diabetes Sister      C.A.D. Brother         He  suddenly     Anesthesia Reaction No family hx of      Osteoporosis No family hx of      Thyroid Disease No family hx of        SOCIAL HISTORY:   Lives in a SNF    MEDICATIONS:  Current Outpatient Medications   Medication Sig Dispense Refill     Acetaminophen (TYLENOL PO) Take 1,000 mg by mouth 3 times daily       Furosemide (LASIX PO) Take 40 mg by mouth 2 times daily        insulin lispro (HUMALOG) 100 UNIT/ML injection 5 units with breakfast, 3 units with dinner. No insulin with lunch       ipratropium - albuterol 0.5 mg/2.5 mg/3 mL (DUONEB) 0.5-2.5 (3) MG/3ML neb solution Take 1 vial by nebulization 2 times daily       ipratropium - albuterol 0.5 mg/2.5 mg/3 mL (DUONEB) 0.5-2.5 (3) MG/3ML nebulization Take 1 vial by nebulization 4 times daily as needed for shortness of breath / dyspnea or wheezing        METFORMIN HCL PO Take 1,000 mg by mouth 2 times daily (with meals)        Mirtazapine (REMERON PO) Take 22.5 mg by mouth At Bedtime        Multiple Vitamins-Minerals (DIABETES SUPPORT PO) Take by mouth 2 times daily       nystatin (MYCOSTATIN) cream Apply to left abdomen topically every morning and at bedtime for redness apply until healed and then change  "to everyday PRN       POTASSIUM CHLORIDE PO Take 20 mEq by mouth 2 times daily        Skin Protectants, Misc. (HYDROCERIN) LOTN Apply to right lower leg topically as needed for dry skin apply to right leg once a day       sodium chloride (OCEAN) 0.65 % nasal spray Spray 2 sprays into both nostrils 2 times daily         Medications reviewed:  Medications reconciled to facility chart and changes were made to reflect current medications as identified as above med list. Below are the changes that were made:   Medications stopped since last EPIC medication reconciliation:   Medications Discontinued During This Encounter   Medication Reason     polyvinyl alcohol (LIQUIFILM TEARS) 1.4 % ophthalmic solution      Medications started since last [place] medication reconciliation:  No orders of the defined types were placed in this encounter.      Case Management:  I have reviewed the care plan and MDS and do agree with the plan.   Information reviewed:  Medications, vital signs, orders, and nursing notes.    ROS:  Unable to be obtained due to cognitive impairment or aphasia.     PHYSICAL EXAM:  /65   Pulse 55   Temp 98.4  F (36.9  C)   Resp 18   Ht 1.778 m (5' 10\")   Wt 65.8 kg (145 lb)   SpO2 97%   BMI 20.81 kg/m     Gen: sitting in wheelchair in no acute distress  Resp: breathing non-labored  GI: abdomen soft  Ext: baseline depenent LE edema  Neuro: CX II-XII grossly in tact; ROM in all four extremities grossly in tact  Psych: memory, judgement and insight impaired    Lab/Diagnostic data:  Reviewed as per Epic    ASSESSMENT/PLAN    1) HTN   SBPs 110s-120s  -- continues on furosemide 40 mg BID (has chronic LE edema as well)    2) DM, Type II   Hgb A1c 7.3 in October. Sugars 160s-210s.  -- continues on metformin 1000 mg BID and lispro 5 units with breakfast and 3 units with dinner (none with lunch)  -- follow sugars and adjust insulin as needed    3) Dementia Without Behavioral Disturbance   Chronic and progressive. " Comfort focus of cares.   -- continues on mirtazapine 22.5 mg at bedtime  -- ongoing 24/7 nursing and supportive cares   Electronically signed by:  Miya Crisostomo MD        Sincerely,        Miya Crisostomo MD

## 2019-03-04 NOTE — PROGRESS NOTES
[facility]  Nursing Home Regulatory Visit  March 4, 2019    Chief Complaint   Patient presents with     residential Regulatory       HPI:    Titus Seay is a 89 year old  (10/27/1927), who is being seen today for a federally mandated E/M visit at [facility]. Today's concerns are:    1) HTN -- SBPs 110s-120s. He is only on furosemide.   2) DM, Type II -- Hgb A1c 7.3 in October. Sugars 160s-210s. Managed with metformin and lispro.  3) Dementia Without Behavioral Disturbance -- Oriented to self. Chronic and progressive. Comfort focus of cares.     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 2012      URI (upper respiratory infection)     sore throat, cough, hoarse - started last night - worse today      Wenckebach's incomplete AV block        PAST SURGICAL HISTORY:   Past Surgical History:   Procedure Laterality Date     C REMOVE EYE W IMPLANT  1995    right eye     C TOTAL KNEE ARTHROPLASTY  8/2010     COLONOSCOPY       DRAIN SKIN ABSCESS SIMPLE  7/2007    right foot     EXCISE MASS HEAD  11/15/2012    Procedure: EXCISE MASS HEAD;  EXCISION OF SCALP MASSES TIMES TWO;  Surgeon: Stevan Gonsales MD;  Location:   OR     FUSION CERVICAL POSTERIOR ONE LEVEL  10/14/2013    Procedure: FUSION CERVICAL POSTERIOR ONE LEVEL;  Cervical 1 - 2 Posterior Cervical Fusions with neuro monitoring, and Dorsal Slit of Foreskin;  Surgeon: Miah Hutson MD;  Location:  OR       FAMILY HISTORY:   Family History   Problem Relation Age of Onset     Alzheimer Disease Father           at age 79 of Pneumonia     Cancer - colorectal Mother          at age 79     Breast Cancer Sister          at age 44      Diabetes Mother      Diabetes Brother          at age 70      Circulatory Brother          of an anurysm at age 70     Diabetes Sister      C.A.D. Brother         He  suddenly     Anesthesia Reaction No family hx of      Osteoporosis No family hx of      Thyroid Disease No family hx of        SOCIAL HISTORY:   Lives in a SNF    MEDICATIONS:  Current Outpatient Medications   Medication Sig Dispense Refill     Acetaminophen (TYLENOL PO) Take 1,000 mg by mouth 3 times daily       Furosemide (LASIX PO) Take 40 mg by mouth 2 times daily        insulin lispro (HUMALOG) 100 UNIT/ML injection 5 units with breakfast, 3 units with dinner. No insulin with lunch       ipratropium - albuterol 0.5 mg/2.5 mg/3 mL (DUONEB) 0.5-2.5 (3) MG/3ML neb solution Take 1 vial by nebulization 2 times daily       ipratropium - albuterol 0.5 mg/2.5 mg/3 mL (DUONEB) 0.5-2.5 (3) MG/3ML nebulization Take 1 vial by nebulization 4 times daily as needed for shortness of breath / dyspnea or wheezing        METFORMIN HCL PO Take 1,000 mg by mouth 2 times daily (with meals)        Mirtazapine (REMERON PO) Take 22.5 mg by mouth At Bedtime        Multiple Vitamins-Minerals (DIABETES SUPPORT PO) Take by mouth 2 times daily       nystatin (MYCOSTATIN) cream Apply to left abdomen topically every morning and at bedtime for redness apply until healed and then change to everyday PRN       POTASSIUM CHLORIDE PO Take 20 mEq by mouth 2 times daily        Skin  "Protectants, Misc. (HYDROCERIN) LOTN Apply to right lower leg topically as needed for dry skin apply to right leg once a day       sodium chloride (OCEAN) 0.65 % nasal spray Spray 2 sprays into both nostrils 2 times daily         Medications reviewed:  Medications reconciled to facility chart and changes were made to reflect current medications as identified as above med list. Below are the changes that were made:   Medications stopped since last EPIC medication reconciliation:   Medications Discontinued During This Encounter   Medication Reason     polyvinyl alcohol (LIQUIFILM TEARS) 1.4 % ophthalmic solution      Medications started since last [facility] medication reconciliation:  No orders of the defined types were placed in this encounter.      Case Management:  I have reviewed the care plan and MDS and do agree with the plan.   Information reviewed:  Medications, vital signs, orders, and nursing notes.    ROS:  Unable to be obtained due to cognitive impairment or aphasia.     PHYSICAL EXAM:  /65   Pulse 55   Temp 98.4  F (36.9  C)   Resp 18   Ht 1.778 m (5' 10\")   Wt 65.8 kg (145 lb)   SpO2 97%   BMI 20.81 kg/m    Gen: sitting in wheelchair in no acute distress  Resp: breathing non-labored  GI: abdomen soft  Ext: baseline depenent LE edema  Neuro: CX II-XII grossly in tact; ROM in all four extremities grossly in tact  Psych: memory, judgement and insight impaired    Lab/Diagnostic data:  Reviewed as per Epic    ASSESSMENT/PLAN    1) HTN   SBPs 110s-120s  -- continues on furosemide 40 mg BID (has chronic LE edema as well)    2) DM, Type II   Hgb A1c 7.3 in October. Sugars 160s-210s.  -- continues on metformin 1000 mg BID and lispro 5 units with breakfast and 3 units with dinner (none with lunch)  -- follow sugars and adjust insulin as needed    3) Dementia Without Behavioral Disturbance   Chronic and progressive. Comfort focus of cares.   -- continues on mirtazapine 22.5 mg at bedtime  -- ongoing 24/7 " nursing and supportive cares   Electronically signed by:  Miya Crisostomo MD

## 2019-03-13 NOTE — TELEPHONE ENCOUNTER
Spoke with patient's daughter Micheline about his decline. She has been noticing a slow decline over the past few weeks. She is OK with checking labs and treating anything at the nursing home as able, but still basically wants comfort care. He was referred to hospice a few months ago, but by the time they assessed him, he had started improving. Hospice did give Micheline their number to call when she thought Titus was ready. She is going to call them today. Will send referral as well

## 2019-03-13 NOTE — TELEPHONE ENCOUNTER
Nursing reports that patient has been more lethargic than usual for 2-3 days now, much worse yesterday. They had to feed him, he ate very little. No fever or cough. SaO2 97% room air. His weight is currently 145 lbs, which is down from 160 lbs about a month ago. Goals of care are comfort, but his daughter wants him to have things treated at the nursing home as able. Despite poor intake, BG 200s.    Assessment:  No s/sx respiratory illness or GI illness  ?UTI  Dehydration related to medications  Possible end of life    PLAN:  CBC with diff, BMP today  Discont lasix and potassium  Continue insulin, but stop metformin due to risk for lactic acidosis